# Patient Record
Sex: MALE | Race: WHITE | NOT HISPANIC OR LATINO | Employment: FULL TIME | URBAN - METROPOLITAN AREA
[De-identification: names, ages, dates, MRNs, and addresses within clinical notes are randomized per-mention and may not be internally consistent; named-entity substitution may affect disease eponyms.]

---

## 2017-10-28 ENCOUNTER — RESOLUTE PROFESSIONAL BILLING HOSPITAL PROF FEE (OUTPATIENT)
Dept: HOSPITALIST | Facility: MEDICAL CENTER | Age: 27
End: 2017-10-28

## 2017-10-28 ENCOUNTER — APPOINTMENT (OUTPATIENT)
Dept: RADIOLOGY | Facility: MEDICAL CENTER | Age: 27
DRG: 958 | End: 2017-10-28
Attending: EMERGENCY MEDICINE
Payer: COMMERCIAL

## 2017-10-28 ENCOUNTER — HOSPITAL ENCOUNTER (INPATIENT)
Facility: MEDICAL CENTER | Age: 27
LOS: 2 days | DRG: 958 | End: 2017-10-30
Attending: EMERGENCY MEDICINE | Admitting: SURGERY
Payer: COMMERCIAL

## 2017-10-28 DIAGNOSIS — S37.30XA INJURY OF URETHRA, INITIAL ENCOUNTER: ICD-10-CM
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
## 2017-10-28 DIAGNOSIS — F10.921 ALCOHOL INTOXICATION WITH DELIRIUM (HCC): ICD-10-CM

## 2017-10-28 PROBLEM — S31.000A: Status: ACTIVE | Noted: 2017-10-28

## 2017-10-28 PROBLEM — T14.90XA TRAUMA: Status: ACTIVE | Noted: 2017-10-28

## 2017-10-28 PROBLEM — F10.929 ACUTE ALCOHOL INTOXICATION (HCC): Status: ACTIVE | Noted: 2017-10-28

## 2017-10-28 LAB
ABO GROUP BLD: NORMAL
ALBUMIN SERPL BCP-MCNC: 5.1 G/DL (ref 3.2–4.9)
ALBUMIN/GLOB SERPL: 1.3 G/DL
ALP SERPL-CCNC: 87 U/L (ref 30–99)
ALT SERPL-CCNC: 36 U/L (ref 2–50)
ANION GAP SERPL CALC-SCNC: 22 MMOL/L (ref 0–11.9)
APTT PPP: 24.9 SEC (ref 24.7–36)
AST SERPL-CCNC: 35 U/L (ref 12–45)
BILIRUB SERPL-MCNC: 0.7 MG/DL (ref 0.1–1.5)
BLD GP AB SCN SERPL QL: NORMAL
BUN SERPL-MCNC: 8 MG/DL (ref 8–22)
CALCIUM SERPL-MCNC: 9.2 MG/DL (ref 8.5–10.5)
CHLORIDE SERPL-SCNC: 106 MMOL/L (ref 96–112)
CO2 SERPL-SCNC: 14 MMOL/L (ref 20–33)
CREAT SERPL-MCNC: 0.77 MG/DL (ref 0.5–1.4)
ERYTHROCYTE [DISTWIDTH] IN BLOOD BY AUTOMATED COUNT: 42.3 FL (ref 35.9–50)
ETHANOL BLD-MCNC: 0.21 G/DL
GFR SERPL CREATININE-BSD FRML MDRD: >60 ML/MIN/1.73 M 2
GLOBULIN SER CALC-MCNC: 3.8 G/DL (ref 1.9–3.5)
GLUCOSE SERPL-MCNC: 139 MG/DL (ref 65–99)
HCT VFR BLD AUTO: 50.9 % (ref 42–52)
HGB BLD-MCNC: 17.8 G/DL (ref 14–18)
INR PPP: 1.05 (ref 0.87–1.13)
MCH RBC QN AUTO: 31 PG (ref 27–33)
MCHC RBC AUTO-ENTMCNC: 35 G/DL (ref 33.7–35.3)
MCV RBC AUTO: 88.7 FL (ref 81.4–97.8)
PLATELET # BLD AUTO: 229 K/UL (ref 164–446)
PMV BLD AUTO: 8.8 FL (ref 9–12.9)
POTASSIUM SERPL-SCNC: 2.9 MMOL/L (ref 3.6–5.5)
PROT SERPL-MCNC: 8.9 G/DL (ref 6–8.2)
PROTHROMBIN TIME: 13.4 SEC (ref 12–14.6)
RBC # BLD AUTO: 5.74 M/UL (ref 4.7–6.1)
RH BLD: NORMAL
SODIUM SERPL-SCNC: 142 MMOL/L (ref 135–145)
WBC # BLD AUTO: 8.3 K/UL (ref 4.8–10.8)

## 2017-10-28 PROCEDURE — 700111 HCHG RX REV CODE 636 W/ 250 OVERRIDE (IP): Performed by: SURGERY

## 2017-10-28 PROCEDURE — 99233 SBSQ HOSP IP/OBS HIGH 50: CPT | Performed by: SURGERY

## 2017-10-28 PROCEDURE — 700101 HCHG RX REV CODE 250

## 2017-10-28 PROCEDURE — 500380 HCHG DRAIN, PENROSE 1/4X12: Performed by: UROLOGY

## 2017-10-28 PROCEDURE — 160035 HCHG PACU - 1ST 60 MINS PHASE I: Performed by: UROLOGY

## 2017-10-28 PROCEDURE — 80307 DRUG TEST PRSMV CHEM ANLYZR: CPT

## 2017-10-28 PROCEDURE — 85027 COMPLETE CBC AUTOMATED: CPT

## 2017-10-28 PROCEDURE — 99291 CRITICAL CARE FIRST HOUR: CPT

## 2017-10-28 PROCEDURE — 160002 HCHG RECOVERY MINUTES (STAT): Performed by: UROLOGY

## 2017-10-28 PROCEDURE — 72131 CT LUMBAR SPINE W/O DYE: CPT

## 2017-10-28 PROCEDURE — 700102 HCHG RX REV CODE 250 W/ 637 OVERRIDE(OP)

## 2017-10-28 PROCEDURE — 70486 CT MAXILLOFACIAL W/O DYE: CPT

## 2017-10-28 PROCEDURE — 93010 ELECTROCARDIOGRAM REPORT: CPT | Performed by: INTERNAL MEDICINE

## 2017-10-28 PROCEDURE — 3E0234Z INTRODUCTION OF SERUM, TOXOID AND VACCINE INTO MUSCLE, PERCUTANEOUS APPROACH: ICD-10-PCS | Performed by: EMERGENCY MEDICINE

## 2017-10-28 PROCEDURE — 500257: Performed by: UROLOGY

## 2017-10-28 PROCEDURE — 700117 HCHG RX CONTRAST REV CODE 255: Performed by: EMERGENCY MEDICINE

## 2017-10-28 PROCEDURE — 307744 HCHG RED TRAUMA TEAM SERVICES

## 2017-10-28 PROCEDURE — 500042 HCHG BAG, URINARY DRAINAGE (CLOSED): Performed by: UROLOGY

## 2017-10-28 PROCEDURE — 94760 N-INVAS EAR/PLS OXIMETRY 1: CPT

## 2017-10-28 PROCEDURE — 80053 COMPREHEN METABOLIC PANEL: CPT

## 2017-10-28 PROCEDURE — 71010 DX-CHEST-PORTABLE (1 VIEW): CPT

## 2017-10-28 PROCEDURE — C1769 GUIDE WIRE: HCPCS | Performed by: UROLOGY

## 2017-10-28 PROCEDURE — 88305 TISSUE EXAM BY PATHOLOGIST: CPT

## 2017-10-28 PROCEDURE — 0TJB8ZZ INSPECTION OF BLADDER, VIA NATURAL OR ARTIFICIAL OPENING ENDOSCOPIC: ICD-10-PCS | Performed by: UROLOGY

## 2017-10-28 PROCEDURE — 0VJ80ZZ INSPECTION OF SCROTUM AND TUNICA VAGINALIS, OPEN APPROACH: ICD-10-PCS | Performed by: UROLOGY

## 2017-10-28 PROCEDURE — 71260 CT THORAX DX C+: CPT

## 2017-10-28 PROCEDURE — 160028 HCHG SURGERY MINUTES - 1ST 30 MINS LEVEL 3: Performed by: UROLOGY

## 2017-10-28 PROCEDURE — 700111 HCHG RX REV CODE 636 W/ 250 OVERRIDE (IP)

## 2017-10-28 PROCEDURE — 500879 HCHG PACK, CYSTO: Performed by: UROLOGY

## 2017-10-28 PROCEDURE — 700105 HCHG RX REV CODE 258: Performed by: SURGERY

## 2017-10-28 PROCEDURE — 86850 RBC ANTIBODY SCREEN: CPT

## 2017-10-28 PROCEDURE — 160048 HCHG OR STATISTICAL LEVEL 1-5: Performed by: UROLOGY

## 2017-10-28 PROCEDURE — 0VQ90ZZ REPAIR RIGHT TESTIS, OPEN APPROACH: ICD-10-PCS | Performed by: UROLOGY

## 2017-10-28 PROCEDURE — 90715 TDAP VACCINE 7 YRS/> IM: CPT | Performed by: EMERGENCY MEDICINE

## 2017-10-28 PROCEDURE — 96365 THER/PROPH/DIAG IV INF INIT: CPT

## 2017-10-28 PROCEDURE — 501838 HCHG SUTURE GENERAL: Performed by: UROLOGY

## 2017-10-28 PROCEDURE — 700111 HCHG RX REV CODE 636 W/ 250 OVERRIDE (IP): Performed by: EMERGENCY MEDICINE

## 2017-10-28 PROCEDURE — A9270 NON-COVERED ITEM OR SERVICE: HCPCS | Performed by: SURGERY

## 2017-10-28 PROCEDURE — 0JQC0ZZ REPAIR PELVIC REGION SUBCUTANEOUS TISSUE AND FASCIA, OPEN APPROACH: ICD-10-PCS | Performed by: UROLOGY

## 2017-10-28 PROCEDURE — 72170 X-RAY EXAM OF PELVIS: CPT

## 2017-10-28 PROCEDURE — 93005 ELECTROCARDIOGRAM TRACING: CPT | Performed by: SURGERY

## 2017-10-28 PROCEDURE — 0T9B70Z DRAINAGE OF BLADDER WITH DRAINAGE DEVICE, VIA NATURAL OR ARTIFICIAL OPENING: ICD-10-PCS | Performed by: UROLOGY

## 2017-10-28 PROCEDURE — 72125 CT NECK SPINE W/O DYE: CPT

## 2017-10-28 PROCEDURE — 86901 BLOOD TYPING SEROLOGIC RH(D): CPT

## 2017-10-28 PROCEDURE — A4357 BEDSIDE DRAINAGE BAG: HCPCS | Performed by: UROLOGY

## 2017-10-28 PROCEDURE — 160039 HCHG SURGERY MINUTES - EA ADDL 1 MIN LEVEL 3: Performed by: UROLOGY

## 2017-10-28 PROCEDURE — 700101 HCHG RX REV CODE 250: Performed by: SURGERY

## 2017-10-28 PROCEDURE — 700111 HCHG RX REV CODE 636 W/ 250 OVERRIDE (IP): Performed by: UROLOGY

## 2017-10-28 PROCEDURE — 86900 BLOOD TYPING SEROLOGIC ABO: CPT

## 2017-10-28 PROCEDURE — 500448 HCHG DRESSING, TELFA 3X4: Performed by: UROLOGY

## 2017-10-28 PROCEDURE — 501329 HCHG SET, CYSTO IRRIG Y TUR: Performed by: UROLOGY

## 2017-10-28 PROCEDURE — 700102 HCHG RX REV CODE 250 W/ 637 OVERRIDE(OP): Performed by: SURGERY

## 2017-10-28 PROCEDURE — 70450 CT HEAD/BRAIN W/O DYE: CPT

## 2017-10-28 PROCEDURE — 72128 CT CHEST SPINE W/O DYE: CPT

## 2017-10-28 PROCEDURE — 160009 HCHG ANES TIME/MIN: Performed by: UROLOGY

## 2017-10-28 PROCEDURE — 160036 HCHG PACU - EA ADDL 30 MINS PHASE I: Performed by: UROLOGY

## 2017-10-28 PROCEDURE — 770006 HCHG ROOM/CARE - MED/SURG/GYN SEMI*

## 2017-10-28 PROCEDURE — 85730 THROMBOPLASTIN TIME PARTIAL: CPT

## 2017-10-28 PROCEDURE — A9270 NON-COVERED ITEM OR SERVICE: HCPCS

## 2017-10-28 PROCEDURE — 85610 PROTHROMBIN TIME: CPT

## 2017-10-28 PROCEDURE — 500266 HCHG CATH, SUPRAPUBIC INTRO SET: Performed by: UROLOGY

## 2017-10-28 PROCEDURE — 72192 CT PELVIS W/O DYE: CPT

## 2017-10-28 PROCEDURE — 700112 HCHG RX REV CODE 229: Performed by: SURGERY

## 2017-10-28 PROCEDURE — 501445 HCHG STAPLER, SKIN DISP: Performed by: UROLOGY

## 2017-10-28 PROCEDURE — 90471 IMMUNIZATION ADMIN: CPT

## 2017-10-28 RX ORDER — ACETAMINOPHEN 500 MG
1000 TABLET ORAL EVERY 6 HOURS
Status: DISCONTINUED | OUTPATIENT
Start: 2017-10-28 | End: 2017-10-30 | Stop reason: HOSPADM

## 2017-10-28 RX ORDER — OXYCODONE HYDROCHLORIDE 10 MG/1
10 TABLET ORAL
Status: DISCONTINUED | OUTPATIENT
Start: 2017-10-28 | End: 2017-10-30 | Stop reason: HOSPADM

## 2017-10-28 RX ORDER — FOLIC ACID 1 MG/1
1 TABLET ORAL DAILY
Status: DISCONTINUED | OUTPATIENT
Start: 2017-10-29 | End: 2017-10-30 | Stop reason: HOSPADM

## 2017-10-28 RX ORDER — MORPHINE SULFATE 4 MG/ML
4 INJECTION, SOLUTION INTRAMUSCULAR; INTRAVENOUS
Status: DISCONTINUED | OUTPATIENT
Start: 2017-10-28 | End: 2017-10-28

## 2017-10-28 RX ORDER — CEFAZOLIN SODIUM 2 G/100ML
2 INJECTION, SOLUTION INTRAVENOUS EVERY 8 HOURS
Status: COMPLETED | OUTPATIENT
Start: 2017-10-28 | End: 2017-10-28

## 2017-10-28 RX ORDER — AMOXICILLIN 250 MG
1 CAPSULE ORAL
Status: DISCONTINUED | OUTPATIENT
Start: 2017-10-28 | End: 2017-10-30 | Stop reason: HOSPADM

## 2017-10-28 RX ORDER — FAMOTIDINE 20 MG/1
20 TABLET, FILM COATED ORAL 2 TIMES DAILY
Status: DISCONTINUED | OUTPATIENT
Start: 2017-10-28 | End: 2017-10-28

## 2017-10-28 RX ORDER — ENEMA 19; 7 G/133ML; G/133ML
1 ENEMA RECTAL
Status: DISCONTINUED | OUTPATIENT
Start: 2017-10-28 | End: 2017-10-30 | Stop reason: HOSPADM

## 2017-10-28 RX ORDER — THIAMINE MONONITRATE (VIT B1) 100 MG
100 TABLET ORAL DAILY
Status: DISCONTINUED | OUTPATIENT
Start: 2017-10-29 | End: 2017-10-30 | Stop reason: HOSPADM

## 2017-10-28 RX ORDER — ONDANSETRON 2 MG/ML
4 INJECTION INTRAMUSCULAR; INTRAVENOUS EVERY 4 HOURS PRN
Status: DISCONTINUED | OUTPATIENT
Start: 2017-10-28 | End: 2017-10-30 | Stop reason: HOSPADM

## 2017-10-28 RX ORDER — CHLORHEXIDINE GLUCONATE ORAL RINSE 1.2 MG/ML
15 SOLUTION DENTAL EVERY 12 HOURS
Status: DISCONTINUED | OUTPATIENT
Start: 2017-10-28 | End: 2017-10-28

## 2017-10-28 RX ORDER — BISACODYL 10 MG
10 SUPPOSITORY, RECTAL RECTAL
Status: DISCONTINUED | OUTPATIENT
Start: 2017-10-28 | End: 2017-10-30 | Stop reason: HOSPADM

## 2017-10-28 RX ORDER — OXYCODONE HYDROCHLORIDE 5 MG/1
5 TABLET ORAL
Status: DISCONTINUED | OUTPATIENT
Start: 2017-10-28 | End: 2017-10-30 | Stop reason: HOSPADM

## 2017-10-28 RX ORDER — SODIUM CHLORIDE, SODIUM LACTATE, POTASSIUM CHLORIDE, CALCIUM CHLORIDE 600; 310; 30; 20 MG/100ML; MG/100ML; MG/100ML; MG/100ML
INJECTION, SOLUTION INTRAVENOUS CONTINUOUS
Status: DISCONTINUED | OUTPATIENT
Start: 2017-10-28 | End: 2017-10-30

## 2017-10-28 RX ORDER — MIDAZOLAM HYDROCHLORIDE 1 MG/ML
INJECTION INTRAMUSCULAR; INTRAVENOUS
Status: DISCONTINUED
Start: 2017-10-28 | End: 2017-10-28

## 2017-10-28 RX ORDER — POLYETHYLENE GLYCOL 3350 17 G/17G
1 POWDER, FOR SOLUTION ORAL 2 TIMES DAILY
Status: DISCONTINUED | OUTPATIENT
Start: 2017-10-28 | End: 2017-10-30 | Stop reason: HOSPADM

## 2017-10-28 RX ORDER — AMOXICILLIN 250 MG
1 CAPSULE ORAL NIGHTLY
Status: DISCONTINUED | OUTPATIENT
Start: 2017-10-28 | End: 2017-10-30 | Stop reason: HOSPADM

## 2017-10-28 RX ORDER — MORPHINE SULFATE 4 MG/ML
2 INJECTION, SOLUTION INTRAMUSCULAR; INTRAVENOUS
Status: DISCONTINUED | OUTPATIENT
Start: 2017-10-28 | End: 2017-10-30 | Stop reason: HOSPADM

## 2017-10-28 RX ORDER — DOCUSATE SODIUM 100 MG/1
100 CAPSULE, LIQUID FILLED ORAL 2 TIMES DAILY
Status: DISCONTINUED | OUTPATIENT
Start: 2017-10-28 | End: 2017-10-30 | Stop reason: HOSPADM

## 2017-10-28 RX ORDER — BACITRACIN ZINC AND POLYMYXIN B SULFATE 500; 1000 [USP'U]/G; [USP'U]/G
OINTMENT TOPICAL 3 TIMES DAILY
Status: DISCONTINUED | OUTPATIENT
Start: 2017-10-28 | End: 2017-10-30 | Stop reason: HOSPADM

## 2017-10-28 RX ORDER — MEPERIDINE HYDROCHLORIDE 25 MG/ML
INJECTION INTRAMUSCULAR; INTRAVENOUS; SUBCUTANEOUS
Status: COMPLETED
Start: 2017-10-28 | End: 2017-10-28

## 2017-10-28 RX ORDER — IBUPROFEN 800 MG/1
800 TABLET ORAL
Status: DISCONTINUED | OUTPATIENT
Start: 2017-10-28 | End: 2017-10-30 | Stop reason: HOSPADM

## 2017-10-28 RX ADMIN — CEFAZOLIN SODIUM 2 G: 2 INJECTION, SOLUTION INTRAVENOUS at 21:23

## 2017-10-28 RX ADMIN — IBUPROFEN 800 MG: 800 TABLET, FILM COATED ORAL at 17:21

## 2017-10-28 RX ADMIN — Medication 1 EACH: at 10:28

## 2017-10-28 RX ADMIN — DOCUSATE SODIUM 100 MG: 100 CAPSULE ORAL at 21:23

## 2017-10-28 RX ADMIN — CEFAZOLIN SODIUM 1 G: 1 INJECTION, SOLUTION INTRAVENOUS at 08:15

## 2017-10-28 RX ADMIN — IOHEXOL 50 ML: 240 INJECTION, SOLUTION INTRATHECAL; INTRAVASCULAR; INTRAVENOUS; ORAL at 08:38

## 2017-10-28 RX ADMIN — CEFAZOLIN SODIUM 2 G: 2 INJECTION, SOLUTION INTRAVENOUS at 15:12

## 2017-10-28 RX ADMIN — OXYCODONE HYDROCHLORIDE 10 MG: 10 TABLET ORAL at 21:23

## 2017-10-28 RX ADMIN — OXYCODONE HYDROCHLORIDE 10 MG: 10 TABLET ORAL at 16:02

## 2017-10-28 RX ADMIN — CLOSTRIDIUM TETANI TOXOID ANTIGEN (FORMALDEHYDE INACTIVATED), CORYNEBACTERIUM DIPHTHERIAE TOXOID ANTIGEN (FORMALDEHYDE INACTIVATED), BORDETELLA PERTUSSIS TOXOID ANTIGEN (GLUTARALDEHYDE INACTIVATED), BORDETELLA PERTUSSIS FILAMENTOUS HEMAGGLUTININ ANTIGEN (FORMALDEHYDE INACTIVATED), BORDETELLA PERTUSSIS PERTACTIN ANTIGEN, AND BORDETELLA PERTUSSIS FIMBRIAE 2/3 ANTIGEN 0.5 ML: 5; 2; 2.5; 5; 3; 5 INJECTION, SUSPENSION INTRAMUSCULAR at 07:54

## 2017-10-28 RX ADMIN — FOLIC ACID: 5 INJECTION, SOLUTION INTRAMUSCULAR; INTRAVENOUS; SUBCUTANEOUS at 10:15

## 2017-10-28 RX ADMIN — SODIUM CHLORIDE, POTASSIUM CHLORIDE, SODIUM LACTATE AND CALCIUM CHLORIDE: 600; 310; 30; 20 INJECTION, SOLUTION INTRAVENOUS at 10:41

## 2017-10-28 RX ADMIN — ACETAMINOPHEN 1000 MG: 500 TABLET ORAL at 17:21

## 2017-10-28 RX ADMIN — FAMOTIDINE 20 MG: 10 INJECTION, SOLUTION INTRAVENOUS at 10:33

## 2017-10-28 RX ADMIN — Medication 1 EACH: at 21:24

## 2017-10-28 RX ADMIN — IOHEXOL 100 ML: 350 INJECTION, SOLUTION INTRAVENOUS at 08:38

## 2017-10-28 RX ADMIN — Medication 1 EACH: at 15:33

## 2017-10-28 RX ADMIN — STANDARDIZED SENNA CONCENTRATE AND DOCUSATE SODIUM 1 TABLET: 8.6; 5 TABLET, FILM COATED ORAL at 21:24

## 2017-10-28 RX ADMIN — MEPERIDINE HYDROCHLORIDE 25 MG: 25 INJECTION INTRAMUSCULAR; INTRAVENOUS; SUBCUTANEOUS at 13:10

## 2017-10-28 RX ADMIN — MORPHINE SULFATE 4 MG: 4 INJECTION INTRAVENOUS at 10:27

## 2017-10-28 ASSESSMENT — PATIENT HEALTH QUESTIONNAIRE - PHQ9
2. FEELING DOWN, DEPRESSED, IRRITABLE, OR HOPELESS: NOT AT ALL
SUM OF ALL RESPONSES TO PHQ9 QUESTIONS 1 AND 2: 0
SUM OF ALL RESPONSES TO PHQ QUESTIONS 1-9: 0
SUM OF ALL RESPONSES TO PHQ9 QUESTIONS 1 AND 2: 0
SUM OF ALL RESPONSES TO PHQ QUESTIONS 1-9: 0
1. LITTLE INTEREST OR PLEASURE IN DOING THINGS: NOT AT ALL
1. LITTLE INTEREST OR PLEASURE IN DOING THINGS: NOT AT ALL
2. FEELING DOWN, DEPRESSED, IRRITABLE, OR HOPELESS: NOT AT ALL

## 2017-10-28 ASSESSMENT — LIFESTYLE VARIABLES
EVER HAD A DRINK FIRST THING IN THE MORNING TO STEADY YOUR NERVES TO GET RID OF A HANGOVER: NO
EVER FELT BAD OR GUILTY ABOUT YOUR DRINKING: YES
HAVE YOU EVER FELT YOU SHOULD CUT DOWN ON YOUR DRINKING: YES
TOTAL SCORE: 2
EVER_SMOKED: NEVER
DO YOU DRINK ALCOHOL: YES
EVER_SMOKED: YES
TOTAL SCORE: 2
ON A TYPICAL DAY WHEN YOU DRINK ALCOHOL HOW MANY DRINKS DO YOU HAVE: 1
CONSUMPTION TOTAL: POSITIVE
HAVE PEOPLE ANNOYED YOU BY CRITICIZING YOUR DRINKING: NO
AVERAGE NUMBER OF DAYS PER WEEK YOU HAVE A DRINK CONTAINING ALCOHOL: 2
TOTAL SCORE: 2
HOW MANY TIMES IN THE PAST YEAR HAVE YOU HAD 5 OR MORE DRINKS IN A DAY: 10
DOES PATIENT WANT TO TALK TO SOMEONE ABOUT QUITTING: YES
DOES PATIENT WANT TO STOP DRINKING: YES

## 2017-10-28 ASSESSMENT — ENCOUNTER SYMPTOMS
MYALGIAS: 1
NEUROLOGICAL NEGATIVE: 1
RESPIRATORY NEGATIVE: 1
GASTROINTESTINAL NEGATIVE: 1
PSYCHIATRIC NEGATIVE: 1
CONSTITUTIONAL NEGATIVE: 1

## 2017-10-28 ASSESSMENT — COPD QUESTIONNAIRES
HAVE YOU SMOKED AT LEAST 100 CIGARETTES IN YOUR ENTIRE LIFE: NO/DON'T KNOW
DURING THE PAST 4 WEEKS HOW MUCH DID YOU FEEL SHORT OF BREATH: NONE/LITTLE OF THE TIME
COPD SCREENING SCORE: 0
DO YOU EVER COUGH UP ANY MUCUS OR PHLEGM?: NO/ONLY WITH OCCASIONAL COLDS OR INFECTIONS

## 2017-10-28 ASSESSMENT — PAIN SCALES - GENERAL
PAINLEVEL_OUTOF10: 8
PAINLEVEL_OUTOF10: 9

## 2017-10-28 NOTE — PROGRESS NOTES
Patient to pre-op with anesthesia. Family at bedside and [place] . Report given to anesthesia and OR RN.
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
## 2017-10-28 NOTE — OR NURSING
Per  Mock patient combative while waking up, re medicated with versed. RN instructed to let patient rest and not to cycle blood pressures per PACU protocol. RN to obtain blood pressure when patient calm and appropriate.

## 2017-10-28 NOTE — PROGRESS NOTES
Patient to unit from CT scan, awaiting transfer to pre op with Dr Castro. [place] [employer] at bedside. All belongings taken by PD as evidence. VSS, patient on RA. No gtts.
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
## 2017-10-28 NOTE — OR NURSING
Report called to Rachel DÍAZ. Plan of care discussed. Patient resting with eyes closed, even and unlabored respirations noted. No acute s/s pf distress noted. Patient arousable to voice. Denies pain at this time.
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
## 2017-10-28 NOTE — RESPIRATORY CARE
Respiratory Trauma Red Note    Intubation no    Patient on RA. No respiratory assistance needed at this time. See trauma narrator for full details.
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
## 2017-10-29 LAB
ABO GROUP BLD: NORMAL
ALBUMIN SERPL BCP-MCNC: 3.6 G/DL (ref 3.2–4.9)
ALBUMIN/GLOB SERPL: 1.3 G/DL
ALP SERPL-CCNC: 64 U/L (ref 30–99)
ALT SERPL-CCNC: 23 U/L (ref 2–50)
ANION GAP SERPL CALC-SCNC: 6 MMOL/L (ref 0–11.9)
AST SERPL-CCNC: 27 U/L (ref 12–45)
BASOPHILS # BLD AUTO: 0.2 % (ref 0–1.8)
BASOPHILS # BLD AUTO: 0.3 % (ref 0–1.8)
BASOPHILS # BLD: 0.02 K/UL (ref 0–0.12)
BASOPHILS # BLD: 0.03 K/UL (ref 0–0.12)
BILIRUB SERPL-MCNC: 2.7 MG/DL (ref 0.1–1.5)
BUN SERPL-MCNC: 13 MG/DL (ref 8–22)
CALCIUM SERPL-MCNC: 8.5 MG/DL (ref 8.5–10.5)
CHLORIDE SERPL-SCNC: 103 MMOL/L (ref 96–112)
CO2 SERPL-SCNC: 25 MMOL/L (ref 20–33)
CREAT SERPL-MCNC: 0.7 MG/DL (ref 0.5–1.4)
EKG IMPRESSION: NORMAL
EOSINOPHIL # BLD AUTO: 0.03 K/UL (ref 0–0.51)
EOSINOPHIL # BLD AUTO: 0.06 K/UL (ref 0–0.51)
EOSINOPHIL NFR BLD: 0.3 % (ref 0–6.9)
EOSINOPHIL NFR BLD: 0.6 % (ref 0–6.9)
ERYTHROCYTE [DISTWIDTH] IN BLOOD BY AUTOMATED COUNT: 43.4 FL (ref 35.9–50)
ERYTHROCYTE [DISTWIDTH] IN BLOOD BY AUTOMATED COUNT: 44.5 FL (ref 35.9–50)
GFR SERPL CREATININE-BSD FRML MDRD: >60 ML/MIN/1.73 M 2
GLOBULIN SER CALC-MCNC: 2.8 G/DL (ref 1.9–3.5)
GLUCOSE SERPL-MCNC: 110 MG/DL (ref 65–99)
HCT VFR BLD AUTO: 38.3 % (ref 42–52)
HCT VFR BLD AUTO: 39 % (ref 42–52)
HGB BLD-MCNC: 13.2 G/DL (ref 14–18)
HGB BLD-MCNC: 13.6 G/DL (ref 14–18)
IMM GRANULOCYTES # BLD AUTO: 0.04 K/UL (ref 0–0.11)
IMM GRANULOCYTES # BLD AUTO: 0.04 K/UL (ref 0–0.11)
IMM GRANULOCYTES NFR BLD AUTO: 0.4 % (ref 0–0.9)
IMM GRANULOCYTES NFR BLD AUTO: 0.4 % (ref 0–0.9)
LYMPHOCYTES # BLD AUTO: 1.71 K/UL (ref 1–4.8)
LYMPHOCYTES # BLD AUTO: 2.1 K/UL (ref 1–4.8)
LYMPHOCYTES NFR BLD: 17.2 % (ref 22–41)
LYMPHOCYTES NFR BLD: 18.5 % (ref 22–41)
MCH RBC QN AUTO: 31.6 PG (ref 27–33)
MCH RBC QN AUTO: 31.8 PG (ref 27–33)
MCHC RBC AUTO-ENTMCNC: 34.5 G/DL (ref 33.7–35.3)
MCHC RBC AUTO-ENTMCNC: 34.9 G/DL (ref 33.7–35.3)
MCV RBC AUTO: 90.5 FL (ref 81.4–97.8)
MCV RBC AUTO: 92.3 FL (ref 81.4–97.8)
MONOCYTES # BLD AUTO: 0.94 K/UL (ref 0–0.85)
MONOCYTES # BLD AUTO: 1.06 K/UL (ref 0–0.85)
MONOCYTES NFR BLD AUTO: 9.3 % (ref 0–13.4)
MONOCYTES NFR BLD AUTO: 9.4 % (ref 0–13.4)
NEUTROPHILS # BLD AUTO: 7.17 K/UL (ref 1.82–7.42)
NEUTROPHILS # BLD AUTO: 8.09 K/UL (ref 1.82–7.42)
NEUTROPHILS NFR BLD: 71.3 % (ref 44–72)
NEUTROPHILS NFR BLD: 72.1 % (ref 44–72)
NRBC # BLD AUTO: 0 K/UL
NRBC # BLD AUTO: 0 K/UL
NRBC BLD AUTO-RTO: 0 /100 WBC
NRBC BLD AUTO-RTO: 0 /100 WBC
PLATELET # BLD AUTO: 150 K/UL (ref 164–446)
PLATELET # BLD AUTO: 163 K/UL (ref 164–446)
PMV BLD AUTO: 9.2 FL (ref 9–12.9)
PMV BLD AUTO: 9.3 FL (ref 9–12.9)
POTASSIUM SERPL-SCNC: 3.5 MMOL/L (ref 3.6–5.5)
PROT SERPL-MCNC: 6.4 G/DL (ref 6–8.2)
RBC # BLD AUTO: 4.15 M/UL (ref 4.7–6.1)
RBC # BLD AUTO: 4.31 M/UL (ref 4.7–6.1)
SODIUM SERPL-SCNC: 134 MMOL/L (ref 135–145)
WBC # BLD AUTO: 10 K/UL (ref 4.8–10.8)
WBC # BLD AUTO: 11.3 K/UL (ref 4.8–10.8)

## 2017-10-29 PROCEDURE — A9270 NON-COVERED ITEM OR SERVICE: HCPCS | Performed by: SURGERY

## 2017-10-29 PROCEDURE — 700101 HCHG RX REV CODE 250: Performed by: SURGERY

## 2017-10-29 PROCEDURE — 85025 COMPLETE CBC W/AUTO DIFF WBC: CPT

## 2017-10-29 PROCEDURE — 770006 HCHG ROOM/CARE - MED/SURG/GYN SEMI*

## 2017-10-29 PROCEDURE — 700102 HCHG RX REV CODE 250 W/ 637 OVERRIDE(OP): Performed by: NURSE PRACTITIONER

## 2017-10-29 PROCEDURE — 700112 HCHG RX REV CODE 229: Performed by: SURGERY

## 2017-10-29 PROCEDURE — 700105 HCHG RX REV CODE 258: Performed by: SURGERY

## 2017-10-29 PROCEDURE — A9270 NON-COVERED ITEM OR SERVICE: HCPCS | Performed by: NURSE PRACTITIONER

## 2017-10-29 PROCEDURE — 36415 COLL VENOUS BLD VENIPUNCTURE: CPT

## 2017-10-29 PROCEDURE — 700102 HCHG RX REV CODE 250 W/ 637 OVERRIDE(OP): Performed by: SURGERY

## 2017-10-29 PROCEDURE — 80053 COMPREHEN METABOLIC PANEL: CPT

## 2017-10-29 RX ADMIN — IBUPROFEN 800 MG: 800 TABLET, FILM COATED ORAL at 09:40

## 2017-10-29 RX ADMIN — POLYETHYLENE GLYCOL 3350 1 PACKET: 17 POWDER, FOR SOLUTION ORAL at 09:40

## 2017-10-29 RX ADMIN — IBUPROFEN 800 MG: 800 TABLET, FILM COATED ORAL at 17:02

## 2017-10-29 RX ADMIN — OXYCODONE HYDROCHLORIDE 10 MG: 10 TABLET ORAL at 01:20

## 2017-10-29 RX ADMIN — ACETAMINOPHEN 1000 MG: 500 TABLET ORAL at 01:20

## 2017-10-29 RX ADMIN — Medication 1 EACH: at 14:26

## 2017-10-29 RX ADMIN — ACETAMINOPHEN 1000 MG: 500 TABLET ORAL at 23:30

## 2017-10-29 RX ADMIN — DOCUSATE SODIUM 100 MG: 100 CAPSULE ORAL at 09:40

## 2017-10-29 RX ADMIN — FOLIC ACID 1 MG: 1 TABLET ORAL at 10:00

## 2017-10-29 RX ADMIN — ACETAMINOPHEN 1000 MG: 500 TABLET ORAL at 17:02

## 2017-10-29 RX ADMIN — DOCUSATE SODIUM 100 MG: 100 CAPSULE ORAL at 20:50

## 2017-10-29 RX ADMIN — POTASSIUM BICARBONATE 25 MEQ: 25 TABLET, EFFERVESCENT ORAL at 14:26

## 2017-10-29 RX ADMIN — Medication 100 MG: at 09:41

## 2017-10-29 RX ADMIN — OXYCODONE HYDROCHLORIDE 10 MG: 10 TABLET ORAL at 09:41

## 2017-10-29 RX ADMIN — POTASSIUM BICARBONATE 25 MEQ: 25 TABLET, EFFERVESCENT ORAL at 20:50

## 2017-10-29 RX ADMIN — OXYCODONE HYDROCHLORIDE 10 MG: 10 TABLET ORAL at 06:12

## 2017-10-29 RX ADMIN — THERA TABS 1 TABLET: TAB at 09:41

## 2017-10-29 RX ADMIN — POLYETHYLENE GLYCOL 3350 1 PACKET: 17 POWDER, FOR SOLUTION ORAL at 20:50

## 2017-10-29 RX ADMIN — STANDARDIZED SENNA CONCENTRATE AND DOCUSATE SODIUM 1 TABLET: 8.6; 5 TABLET, FILM COATED ORAL at 20:50

## 2017-10-29 RX ADMIN — ACETAMINOPHEN 1000 MG: 500 TABLET ORAL at 12:31

## 2017-10-29 RX ADMIN — OXYCODONE HYDROCHLORIDE 10 MG: 10 TABLET ORAL at 17:02

## 2017-10-29 RX ADMIN — IBUPROFEN 800 MG: 800 TABLET, FILM COATED ORAL at 12:31

## 2017-10-29 RX ADMIN — ACETAMINOPHEN 1000 MG: 500 TABLET ORAL at 06:12

## 2017-10-29 RX ADMIN — OXYCODONE HYDROCHLORIDE 10 MG: 10 TABLET ORAL at 20:50

## 2017-10-29 RX ADMIN — OXYCODONE HYDROCHLORIDE 10 MG: 10 TABLET ORAL at 12:30

## 2017-10-29 RX ADMIN — Medication: at 09:00

## 2017-10-29 RX ADMIN — MAGNESIUM HYDROXIDE 30 ML: 400 SUSPENSION ORAL at 09:40

## 2017-10-29 RX ADMIN — SODIUM CHLORIDE, POTASSIUM CHLORIDE, SODIUM LACTATE AND CALCIUM CHLORIDE: 600; 310; 30; 20 INJECTION, SOLUTION INTRAVENOUS at 14:30

## 2017-10-29 RX ADMIN — Medication 1 EACH: at 20:49

## 2017-10-29 RX ADMIN — POTASSIUM BICARBONATE 25 MEQ: 25 TABLET, EFFERVESCENT ORAL at 09:40

## 2017-10-29 ASSESSMENT — ENCOUNTER SYMPTOMS
FEVER: 0
CONSTITUTIONAL NEGATIVE: 1
RESPIRATORY NEGATIVE: 1
ABDOMINAL PAIN: 1
GASTROINTESTINAL NEGATIVE: 1
NEUROLOGICAL NEGATIVE: 1
PSYCHIATRIC NEGATIVE: 1
MYALGIAS: 1
CHILLS: 0
NAUSEA: 0
VOMITING: 0
SHORTNESS OF BREATH: 0
SENSORY CHANGE: 0

## 2017-10-29 ASSESSMENT — PAIN SCALES - GENERAL
PAINLEVEL_OUTOF10: 9
PAINLEVEL_OUTOF10: 7
PAINLEVEL_OUTOF10: 7
PAINLEVEL_OUTOF10: 8
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
## 2017-10-29 NOTE — CARE PLAN
Problem: Pain Management  Goal: Pain level will decrease to patient's comfort goal  Outcome: PROGRESSING AS EXPECTED  Patient receiving oral analgesics with adequate pain relief    Problem: Skin Integrity  Goal: Risk for impaired skin integrity will decrease  Outcome: PROGRESSING AS EXPECTED  Penrose drain pulled baci applied, guaze placed ashley ice pack placed.

## 2017-10-29 NOTE — PROGRESS NOTES
Hemoglobin level drop noted to 13 from 17. Pt asymptomatic. VSS. Surgical site drainage amount decreased from last night. Will continue to monitor pt closely.
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
## 2017-10-29 NOTE — PROGRESS NOTES
Received report from day shift RN.  Pt A&O x 4.   Pain reported at 9/10. Medicated per MAR.   Pt denies nausea, vomiting, chest pain, shortness of breath.   Rally bag running via PIV.   Gauze at scrotum surgical site changed. Moderate amount of blood noted at surgical site with penrose drain.  SCDs in place.   Pt ambulates with 2 assists due to pain.   Last BM was on 10/27 PTA. +BS x 4. Suprapubic catheter in place.  Call light within reach. Bed locked and in lowest position.  All needs are met at this time. Plan of care discussed with pt.    Pt educated regarding fall risk and intervention. Pt verbalized understanding and still refusing bed alarm. Pt A&O x4. Pt demonstrates appropriate use of call light to call for assistance. Hourly rounding in place.

## 2017-10-29 NOTE — CARE PLAN
Problem: Safety  Goal: Will remain free from falls  Outcome: PROGRESSING AS EXPECTED  Pt calls for assistance appropriately. Hourly rounding in place. Pt's room near nursing station.     Problem: Venous Thromboembolism (VTW)/Deep Vein Thrombosis (DVT) Prevention:  Goal: Patient will participate in Venous Thrombosis (VTE)/Deep Vein Thrombosis (DVT)Prevention Measures  Outcome: PROGRESSING AS EXPECTED  SCDs in place.    Problem: Skin Integrity  Goal: Risk for impaired skin integrity will decrease  Outcome: PROGRESSING AS EXPECTED  Dressing changed as needed.

## 2017-10-29 NOTE — PROGRESS NOTES
"/51   Pulse (!) 55   Temp 36.6 °C (97.8 °F)   Resp 17   Ht 1.727 m (5' 8\")   Wt 88.3 kg (194 lb 10.7 oz)   SpO2 93%   BMI 29.60 kg/m²     Hemoglobin stable  Ice to scrotum  Mobilize    "
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
## 2017-10-29 NOTE — PROGRESS NOTES
Sarahi SANCHES called regarding pt's heavy drainage from surgical site. She was also updated on hemoglobin level drop.

## 2017-10-30 VITALS
WEIGHT: 194.67 LBS | BODY MASS INDEX: 29.5 KG/M2 | HEIGHT: 68 IN | SYSTOLIC BLOOD PRESSURE: 107 MMHG | RESPIRATION RATE: 17 BRPM | TEMPERATURE: 98.4 F | DIASTOLIC BLOOD PRESSURE: 58 MMHG | OXYGEN SATURATION: 95 % | HEART RATE: 62 BPM

## 2017-10-30 LAB
MAGNESIUM SERPL-MCNC: 2.4 MG/DL (ref 1.5–2.5)
PHOSPHATE SERPL-MCNC: 2.4 MG/DL (ref 2.5–4.5)

## 2017-10-30 PROCEDURE — A9270 NON-COVERED ITEM OR SERVICE: HCPCS | Performed by: SURGERY

## 2017-10-30 PROCEDURE — 700112 HCHG RX REV CODE 229: Performed by: SURGERY

## 2017-10-30 PROCEDURE — 83735 ASSAY OF MAGNESIUM: CPT

## 2017-10-30 PROCEDURE — 36415 COLL VENOUS BLD VENIPUNCTURE: CPT

## 2017-10-30 PROCEDURE — 700101 HCHG RX REV CODE 250

## 2017-10-30 PROCEDURE — 700105 HCHG RX REV CODE 258

## 2017-10-30 PROCEDURE — 700102 HCHG RX REV CODE 250 W/ 637 OVERRIDE(OP): Performed by: SURGERY

## 2017-10-30 PROCEDURE — 700101 HCHG RX REV CODE 250: Performed by: SURGERY

## 2017-10-30 PROCEDURE — 84100 ASSAY OF PHOSPHORUS: CPT

## 2017-10-30 RX ORDER — OXYCODONE HYDROCHLORIDE 10 MG/1
5-10 TABLET ORAL EVERY 4 HOURS PRN
Qty: 30 TAB | Refills: 0 | Status: SHIPPED | OUTPATIENT
Start: 2017-10-30 | End: 2017-11-24

## 2017-10-30 RX ORDER — ACETAMINOPHEN 500 MG
TABLET ORAL
Qty: 60 TAB | Refills: 0 | Status: SHIPPED | OUTPATIENT
Start: 2017-10-30 | End: 2017-11-24

## 2017-10-30 RX ORDER — IBUPROFEN 800 MG/1
TABLET ORAL
Qty: 60 TAB | Refills: 0 | Status: SHIPPED | OUTPATIENT
Start: 2017-10-30 | End: 2017-11-24

## 2017-10-30 RX ORDER — SODIUM CHLORIDE 9 MG/ML
INJECTION, SOLUTION INTRAVENOUS
Status: COMPLETED
Start: 2017-10-30 | End: 2017-10-30

## 2017-10-30 RX ADMIN — IBUPROFEN 800 MG: 800 TABLET, FILM COATED ORAL at 12:22

## 2017-10-30 RX ADMIN — Medication 1 EACH: at 15:05

## 2017-10-30 RX ADMIN — OXYCODONE HYDROCHLORIDE 10 MG: 10 TABLET ORAL at 05:20

## 2017-10-30 RX ADMIN — Medication 100 MG: at 09:00

## 2017-10-30 RX ADMIN — OXYCODONE HYDROCHLORIDE 10 MG: 10 TABLET ORAL at 09:00

## 2017-10-30 RX ADMIN — Medication 1 EACH: at 09:00

## 2017-10-30 RX ADMIN — ACETAMINOPHEN 1000 MG: 500 TABLET ORAL at 05:20

## 2017-10-30 RX ADMIN — THERA TABS 1 TABLET: TAB at 09:00

## 2017-10-30 RX ADMIN — FOLIC ACID 1 MG: 1 TABLET ORAL at 09:00

## 2017-10-30 RX ADMIN — IBUPROFEN 800 MG: 800 TABLET, FILM COATED ORAL at 09:00

## 2017-10-30 RX ADMIN — POLYETHYLENE GLYCOL 3350 1 PACKET: 17 POWDER, FOR SOLUTION ORAL at 09:01

## 2017-10-30 RX ADMIN — DOCUSATE SODIUM 100 MG: 100 CAPSULE ORAL at 09:00

## 2017-10-30 RX ADMIN — ACETAMINOPHEN 1000 MG: 500 TABLET ORAL at 12:22

## 2017-10-30 RX ADMIN — SODIUM CHLORIDE 1000 ML: 9 INJECTION, SOLUTION INTRAVENOUS at 13:09

## 2017-10-30 ASSESSMENT — ENCOUNTER SYMPTOMS
NEUROLOGICAL NEGATIVE: 1
PSYCHIATRIC NEGATIVE: 1
RESPIRATORY NEGATIVE: 1
CONSTITUTIONAL NEGATIVE: 1
MYALGIAS: 1
GASTROINTESTINAL NEGATIVE: 1

## 2017-10-30 ASSESSMENT — PAIN SCALES - GENERAL
PAINLEVEL_OUTOF10: 4
PAINLEVEL_OUTOF10: 7

## 2017-10-30 NOTE — CARE PLAN
Problem: Safety  Goal: Will remain free from falls  Outcome: PROGRESSING AS EXPECTED  Pt calls for assistance appropriately. Pt ambulates with standby assist.     Problem: Bowel/Gastric:  Goal: Normal bowel function is maintained or improved  Outcome: PROGRESSING AS EXPECTED  Pt had BM tonight, medium stool.

## 2017-10-30 NOTE — PROGRESS NOTES
Received report from day shift RN.   A&O x 4. Pashto speaker.   Pain reported at 7/10. Medicated per MAR.  Pt denies nausea, vomiting, chest pain, and shortness of breath at this time.  Pt has low appetite.   Gauze at scrotum surgical site changed. Small amount of serosanguinous drainage noted at old dressing.   SCDs in place.   Last BM was on 10/27 PTA. +BS x 4. +flatus. Scheduled stools softeners given per MAR.  Suprapubic catheter in place with orange color clear urine.   Call light within reach. Bed locked and in lowest position.   All needs are met at this time. Plan of care discussed with pt.
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
## 2017-10-30 NOTE — DISCHARGE INSTRUCTIONS
- Call or seek medical attention for questions or concerns   - Follow up with Dr. Castro in 1-2 weeks time   - Follow up with primary care provider within one weeks time   - Resume regular diet   - Continue daily over the counter stool softener while on narcotics   - No operation of machinery or motorized vehicles while under the influence of narcotics   - No alcohol use while under the influence of narcotics   - No swimming, hot tubs, baths or wound submersion until cleared by outpatient provider. May shower   - No contact sports, strenuous activities, or heavy lifting until cleared by outpatient provider   - If respiratory decompensation, change in condition or worsening condition, or signs or symptoms of infection occur seek medical attention    Suprapubic Catheter Home Guide  A suprapubic catheter is a rubber tube with a tiny balloon on the end. It is used to drain urine from the bladder. This catheter is put in your bladder through a small opening in the lower center part of your abdomen. Suprapubic refers to the area right above your pubic bone.  The balloon on the end of the catheter is filled with germ-free (sterile) water. This keeps the catheter from slipping out. When the catheter is in place, your urine will drain into a collection bag. The bag can be put beside your bed at night or attached to your leg during the day.  HOW TO CARE FOR YOUR CATHETER   Cleaning your skin  · Clean the skin around the catheter opening every day.  ¨ Wash your hands with soap and water.  ¨ Clean the skin around the opening with a clean washcloth and soapy water. Do not pull on the tube.  ¨ Pat the area dry with a clean towel.  · Your caregiver may want you to put a bandage (dressing) over the site. Do not use ointment on this area unless your caregiver tells you to.  Cleaning the catheter  · Ask your caregiver if you need to clean the catheter and how often.  · Use only soap and water.  · There may be crusts on the catheter.  Put hydrogen peroxide on a cotton ball or gauze pad to remove any crust.   Emptying the collection bag  · You may have a large drainage bag to use at night and a smaller one for daytime. Empty the large bag every 8 hours. Empty the small bag when it is about  full.  · Keep the drainage bag below the level of the catheter. This keeps urine from flowing backwards.  · Hold the bag over the toilet or another container. Release the valve (spigot) at the bottom of the bag. Do not touch the opening of the spigot. Do not let the opening touch the toilet or container.  · Close the spigot tightly when the bag is empty.  Cleaning the collection bag  · Clean the bag every few days.  ¨ First, wash your hands.  ¨ Disconnect the tubing from the catheter. Replace the used bag with a new bag. Then you can clean the used one.  ¨ Empty the used bag completely. Rinse it out with warm water and soap or fill the bag with water and add 1 teaspoon of vinegar. Let it sit for about 30 minutes. Then drain.  · The bag should be completely dry before storing it. Put it inside a plastic bag to keep it clean.  Checking everything  · Always make sure there are no kinks in the catheter or tubing.  · Always make sure there are no leaks in the catheter, tubing, or collection bag.  RISKS AND COMPLICATIONS  · Urine flow can become blocked. This can happen if the catheter or tubes are not working right. A blood clot can also block urine flow.  · The catheter might irritate tissue in your body. This can cause bleeding.  · The skin near the opening for the catheter may become irritated or infected.  · Bacteria may get into your bladder. This can cause a urinary tract infection.  HOME CARE INSTRUCTIONS  · Take all medicines prescribed by your caregiver. Follow the directions carefully.  · Drink 8 glasses of water every day. This produces good urine flow.  · Check the skin around your catheter a few times every day. Watch for redness and swelling. Look for  any fluids coming out of the opening.  · Do not use powder or cream around the catheter opening.  · Do not take tub baths or use pools or hot tubs.  · Keep all follow-up appointments.  SEEK MEDICAL CARE IF:  · You leak urine.  · Your skin around the catheter becomes red or sore.  · Your urine flow slows down.  · Your urine gets cloudy or smelly.  SEEK IMMEDIATE MEDICAL CARE IF:   · You have chills, nausea, or back pain.  · You have trouble changing your catheter.  · Your catheter comes out.  · You have blood in your urine.  · You have no urine flow for 1 hour.  · You have a fever.     This information is not intended to replace advice given to you by your health care provider. Make sure you discuss any questions you have with your health care provider.     Document Released: 09/04/2012 Document Revised: 05/03/2016 Document Reviewed: 09/04/2012  [employer] Interactive Patient Education ©2016 [employer]    Catéter suprapúbico - Guía para el hogar   (Suprapubic Catheter Home Guide)  Un catéter suprapúbico es un tubo de goma con un pequeño balón en la punta. Se utiliza para drenar la orina de la vejiga. [place] catéter se coloca en la vejiga a través de cherrie pequeña abertura en la parte central inferior del abdomen. Suprapúbico se refiere al área que se encuentra susu arriba del hueso púbico.   El globo en el extremo del catéter se llena con agua kimberly de gérmenes (estéril) . [place] que el catéter se salga. Cuando el catéter está en benito lugar, la orina fluirá hacia cherrie bolsa de recolección. Puede poner la bolsa al lado de la cama por la noche o sujetarla a la pierna earnest el día.   CÓMO CUIDAR EL CATÉTER   Limpie la piel  · Limpie la piel que está alrededor de la abertura del catéter.  ¨ Lave dayton benji con agua y jabón.  ¨ Limpie la piel que rodea la abertura con un paño limpio y agua jabonosa. No tire del tubo.  ¨ Seque yeny el área con cherrie toalla limpia dando golpecitos.  · El médico le hará cherrie curación (vendaje)  sobre la rafal. No use lociones o cremas excepto que benito médico lo indique.  Limpie el catéter   · Pregúntele a benito médico si necesita limpiar el catéter y con qué frecuencia.  · Use sólo jabón y agua.  · Puede simona costras sobre el catéter. Ponga el peróxido de hidrógeno en cherrie aakash de algodón o gasa para eliminar cualquier costra.    Vaciado de la bolsa   · Es posible que tenga cherrie bolsa de drenaje rehana para usar por la noche y cherrie más pequeña para el día. Vacíe la bolsa rehana cada 8 horas. Vacíe la bolsa pequeña cuando esté llena en  de benito capacidad.  · Mantenga la bolsa de drenaje yeny por debajo del nivel de la vejiga. [place] impide que la orina fluya hacia atrás.  · Sostenga la bolsa en el inodoro u otro recipiente. Libere la válvula (grifo) que se encuentra en la parte inferior de la bolsa. No toque la abertura del grifo. No deje que la abertura toque el inodoro o el recipiente.  · Cierre el grifo con fuerza cuando la bolsa está vacía.  Limpieza de la bolsa de drenaje   · Limpie la bolsa cada algunos pocos días.  ¨ [place] lávese yeny las benji.  ¨ Desconecte el tubo del catéter. Reemplace la bolsa usada con cherrie nueva bolsa. Entonces podrá limpiar la bolsa usada.  ¨ Vacíe la bolsa completamente. Lave con agua tibia y jabón o llene la bolsa con agua y añada 1 cucharadita de vinagre. Dejar reposar earnest unos 30 minutos. Luego escurra.  · La bolsa debe estar completamente seca antes de guardarla. Guárdela en cherrie bolsa de plástico para mantenerla limpia.  Controle todo   · Asegúrese siempre de que no haya torceduras en el catéter o el tubo.  · Asegúrese siempre de que no haya fugas en el catéter, los tubos, o la bolsa.  RIESGOS Y COMPLICACIONES  · El flujo de orina se puede obstruir. [place] puede suceder si el catéter o los tubos no están funcionando yeny. Un coágulo de gio también puede obstruir el flujo de orina.  · El catéter puede irritar los tejidos de benito cuerpo. Puede ocasionar hemorragias.  · La piel  alrededor de la abertura del catéter puede llegar a irritarse o infectarse.  · Las bacterias pueden entrar en la vejiga. [place] podría favorecer cherrie infección del tracto urinario.  INSTRUCCIONES PARA EL CUIDADO EN EL HOGAR   · [place] los medicamentos jordna le indicó el médico. Siga cuidadosamente las indicaciones.  · [place] 8 vasos grandes de agua por día. [place] favorece el flujo de la orina.  · Controle la piel que rodea el catéter varias veces por día. Observe señales de enrojecimiento e inflamación. [place]isel si hay pérdida de líquido por la abertura.  · No se aplique polvos ni cremas alrededor de la abertura.  · No tome gina, no practique natación ni utilice el jacuzzi.  · Cumpla con las visitas de control kai jordan se le indicó.  SOLICITE ATENCIÓN MÉDICA SI:   · Pierde orina.  · La piel que rodea el catéter enrojece o se vuelve sensible.  · La orina fluye hacia abajo.  · La orina es de color turbio o huele mal.  SOLICITE ATENCIÓN MÉDICA DE INMEDIATO SI:   · Siente escalofríos, náuseas o dolor en la espalda.  · Tiene dificultad para cambiar el catéter.  · El catéter se sale del lugar.  · Usted presenta gio en la orina.  · No observa flujo de orina earnest 1 hora.  · Tiene fiebre.     Esta información no tiene jordan fin reemplazar el consejo del médico. Asegúrese de hacerle al médico cualquier pregunta que tenga.     Document Released: 12/06/2012 Document Revised: 05/03/2016  [employer] Interactive Patient Education ©2016 [employer] Inc.      Harris Catheter Care, Adult  A Harris catheter is a soft, flexible tube that is placed into the bladder to drain urine. A Harris catheter may be inserted if:  · You leak urine or are not able to control when you urinate (urinary incontinence).  · You are not able to urinate when you need to (urinary retention).  · You had prostate surgery or surgery on the genitals.  · You have certain medical conditions, such as multiple sclerosis, dementia, or a spinal cord injury.  If you are going home  with a Harris catheter in place, follow the instructions below.  TAKING CARE OF THE CATHETER  1. Wash your hands with soap and water.  2. Using mild soap and warm water on a clean washcloth:  ¨ Clean the area on your body closest to the catheter insertion site using a circular motion, moving away from the catheter. Never wipe toward the catheter because this could sweep bacteria up into the urethra and cause infection.  ¨ Remove all traces of soap. Pat the area dry with a clean towel. For males, reposition the foreskin.  3. Attach the catheter to your leg so there is no tension on the catheter. Use adhesive tape or a leg strap. If you are using adhesive tape, remove any sticky residue left behind by the previous tape you used.  4. Keep the drainage bag below the level of the bladder, but keep it off the floor.  5. Check throughout the day to be sure the catheter is working and urine is draining freely. Make sure the tubing does not become kinked.  6. Do not pull on the catheter or try to remove it. Pulling could damage internal tissues.  TAKING CARE OF THE DRAINAGE BAGS  You will be given two drainage bags to take home. One is a large overnight drainage bag, and the other is a smaller leg bag that fits underneath clothing. You may wear the overnight bag at any time, but you should never wear the smaller leg bag at night. Follow the instructions below for how to empty, change, and clean your drainage bags.  Emptying the Drainage Bag  You must empty your drainage bag when it is  -½ full or at least 2-3 times a day.  1. Wash your hands with soap and water.  2. Keep the drainage bag below your hips, below the level of your bladder. This stops urine from going back into the tubing and into your bladder.  3. Hold the dirty bag over the toilet or a clean container.  4. Open the pour spout at the bottom of the bag and empty the urine into the toilet or container. Do not let the pour spout touch the toilet, container, or any  other surface. Doing so can place bacteria on the bag, which can cause an infection.  5. Clean the pour spout with a gauze pad or cotton ball that has rubbing alcohol on it.  6. Close the pour spout.  7. Attach the bag to your leg with adhesive tape or a leg strap.  8. Wash your hands well.  Changing the Drainage Bag  Change your drainage bag once a month or sooner if it starts to smell bad or look dirty. Below are steps to follow when changing the drainage bag.  1. Wash your hands with soap and water.  2. Pinch off the rubber catheter so that urine does not spill out.  3. Disconnect the catheter tube from the drainage tube at the connection valve. Do not let the tubes touch any surface.  4. Clean the end of the catheter tube with an alcohol wipe. Use a different alcohol wipe to clean the end of the drainage tube.  5. Connect the catheter tube to the drainage tube of the clean drainage bag.  6. Attach the new bag to the leg with adhesive tape or a leg strap. Avoid attaching the new bag too tightly.  7. Wash your hands well.  Cleaning the Drainage Bag  1. Wash your hands with soap and water.  2. Wash the bag in warm, soapy water.  3. Rinse the bag thoroughly with warm water.  4. Fill the bag with a solution of white vinegar and water (1 cup vinegar to 1 qt warm water [.2 L vinegar to 1 L warm water]). Close the bag and soak it for 30 minutes in the solution.  5. Rinse the bag with warm water.  6. Hang the bag to dry with the pour spout open and hanging downward.  7. Store the clean bag (once it is dry) in a clean plastic bag.  8. Wash your hands well.  PREVENTING INFECTION  · Wash your hands before and after handling your catheter.  · Take showers daily and wash the area where the catheter enters your body. Do not take baths. Replace wet leg straps with dry ones, if this applies.  · Do not use powders, sprays, or lotions on the genital area. Only use creams, lotions, or ointments as directed by your caregiver.  · For  females, wipe from front to back after each bowel movement.  · Drink enough fluids to keep your urine clear or pale yellow unless you have a fluid restriction.  · Do not let the drainage bag or tubing touch or lie on the floor.  · Wear cotton underwear to absorb moisture and to keep your .  SEEK MEDICAL CARE IF:   · Your urine is cloudy or smells unusually bad.  · Your catheter becomes clogged.  · You are not draining urine into the bag or your bladder feels full.  · Your catheter starts to leak.  SEEK IMMEDIATE MEDICAL CARE IF:   · You have pain, swelling, redness, or pus where the catheter enters the body.  · You have pain in the abdomen, legs, lower back, or bladder.  · You have a fever.  · You see blood fill the catheter, or your urine is pink or red.  · You have nausea, vomiting, or chills.  · Your catheter gets pulled out.  MAKE SURE YOU:   · Understand these instructions.  · Will watch your condition.  · Will get help right away if you are not doing well or get worse.     This information is not intended to replace advice given to you by your health care provider. Make sure you discuss any questions you have with your health care provider.     Document Released: 12/18/2006 Document Revised: 05/04/2015 Document Reviewed: 12/09/2013  [employer] Interactive Patient Education ©2016 [employer] Inc.      Cuidados del catéter Harris - Adultos  (Harris Catheter Care, Adult)  Un catéter Harris es un tubo bruno que se coloca en la vejiga para drenar la orina. Se colocará si:   · Pierde orina y no puede controlar cuándo orina (incontinencia urinaria).  · No puede orinar cuando lo necesita (retención).  · Fue sometido a cherrie cirugía de próstata o en los genitales.  · Sufre alguna afección jordan la esclerosis múltiple, demencia o lesión de la médula welch.  Si le dan el tammi con un catéter de Harris colocado, siga las siguientes instrucciones.   CUIDADOS DEL CATÉTER  7. Lave dayton benji con agua y jabón.  8. Use un  jabón suave y agua tibia en un paño limpio.  ¨ Limpie la rafal que rodea el sitio de inserción del catéter mediante un movimiento circular, alejándose de la sonda. Nunca limpie hacia el catéter ya que jordan barrido puede hacer ingresar bacterias en la uretra y causar infección.  ¨ Retire todos los restos de jabón. Seque yeny el área con bridget toalla limpia dando golpecitos. En el travis de los hombres, lleve el prepucio a benito lugar.  9. Adjunte el catéter a la pierna de modo que no haya tensión en el catéter. Use cinta adhesiva o bridget malcolm para unirlo a la pierna. Si usa bridget cinta adhesiva, elimine el residuo pegajoso que haya dejado la cinta anterior.  10. Mantenga la bolsa de drenaje debajo del nivel de la vejiga, kamilla lejos del suelo.  11. Controle earnest el día para asegurarse de que el catéter esté funcionando y la orina drene libremente. Asegúrese de que el tubo no se retuerza.  12. No tire del catéter ni trate de quitarlo. El tironeo puede dañar los tejidos internos.  CUIDADOS DE LA BOLSA DE DRENAJE  Le darán dos bolsas de drenaje para que lleve a benito casa. Bridget de ellas es bridget bolsa rehana de drenaje para usar earnest la noche y la otra es bridget bolsa de pierna de alex tamaño, que se ajusta debajo de la ropa. Usted puede usar la bolsa de la noche en cualquier momento, kamilla nunca debe usar la bolsa más pequeña earnest la noche. Siga las siguientes instrucciones para saber cómo vaciar, cambiar y limpiar dayton bolsas de drenaje.   Vaciado de la bolsa de drenaje  Debe vaciar la bolsa de drenaje cuando esté a  - ½ completa o al menos 2 a 3 veces al día.   9. Lave dayton benji con agua y jabón.  10. Mantenga la bolsa de drenaje yeny por debajo del nivel de la vejiga. [place] impide que la orina vuelva al tubo y a la vejiga.  11. Sostenga la bolsa en el inodoro u otro recipiente.  12. Jewel jerod vertedor de la parte inferior de la bolsa y vacíe la orina en el inodoro o un recipiente. No deje que el jerod vertedor toque el inodoro,  el contenedor o cualquier otra superficie. Si lo hace, las bacterias pueden ingresar a la bolsa y causar cherrie infección.  13. Limpie la boca de vertidor con cherrie gasa o algodón con alcohol.  14. Cierre el jerod vertedor.  15. Cherrie la bolsa a la pierna con cinta adhesiva o con cherrie malcolm para la pierna.  16. Lávese yeny las benji.  Cambio de la bolsa de drenaje  Cambie la bolsa de drenaje cherrie vez al mes o antes si empieza a oler mal o la ve sucia. A continuación verá los pasos a seguir cuando deba cambiar la bolsa de drenaje.   8. Lave dayton benji con agua y jabón.  9. Comprima el catéter de goma para que la orina no se derrame.  10. Desconecte el tubo del catéter del tubo de drenaje en la conexión de la válvula. No deje que los tubos toquen ninguna superficie.  11. Limpie el extremo del catéter con un hisopo con alcohol. Use un hisopo con alcohol diferente para limpiar el extremo del tubo de drenaje.  12. Conecte el tubo del catéter al tubo de drenaje de la bolsa limpia.  13. Adhiera la nueva bolsa a la pierna con cinta adhesiva o un broche. Evite ajustar mucho la nueva bolsa.  14. Lávese yeny las benji.  Limpieza de la bolsa de drenaje  9. Lave dayton benji con agua y jabón.  10. Lave la bolsa con agua tibia jabonosa.  11. Enjuáguela cuidadosamente con agua tibia.  12. Llene la bolsa con cherrie solución de vinagre aguilar y agua (1 taza de vinagre y 950 cc de agua tibia [200 cc vinagre en 1 L de agua tibia). Cierre la bolsa y déjela en remojo earnest 30 minutos con la solución.  13. Enjuague la bolsa con [place].  14. Cuelgue la bolsa para que se seque con el jerod vertedor abierta y colgando hacia abajo.  15. Guarde la bolsa limpia (cherrie vez seca) y cherrie bolsa de plástico limpia.  16. Lávese yeny las benji.  PREVENCIÓN DE INFECCIONES   · Lávese las benji antes y después de manipular el catéter.  · [place] duchas diariamente y lave el área en la que el catéter ingresa a benito cuerpo. No tome gina. Reemplace las correas mojadas  por correas secas, si corresponde.  · No use talcos, aerosoles o lociones en el área genital. Sólo use cremas, lociones o ungüentos jordan le indique benito médico.  · Las mujeres deben higienizarse de adelante hacia atrás después de  el intestino.  · [place] suficiente líquido para mantener la orina willie o de color amarillo pálido, excepto si tiene restricción de líquidos.  · No deje que la bolsa de drenaje ni los tubos toquen el suelo.  · Use ropa interior de algodón para absorber la humedad y para mantener benito piel más seca.  SOLICITE ATENCIÓN MÉDICA SI:   · La orina es turbia o huele mal.  · El catéter se obstruye.  · No drena orina hacia la bolsa o siente la vejiga llena.  · El catéter comienza a perder.  SOLICITE ATENCIÓN MÉDICA DE INMEDIATO SI:   · Tiene dolor, enrojecimiento inflamación u observa pus en el sitio de ingreso del catéter.  · Siente dolor en el abdomen, las piernas, la cintura o la vejiga.  · Tiene fiebre.  · Observa gio en el catéter o la orina es de color ochoa o esther.  · Tiene náuseas, vómitos o escalofríos.  · El catéter se sale.  ASEGÚRESE DE QUE:   · Comprende estas instrucciones.  · Controlará benito enfermedad.  · Solicitará ayuda de inmediato si no mejora o si empeora.     Esta información no tiene jordan fin reemplazar el consejo del médico. Asegúrese de hacerle al médico cualquier pregunta que tenga.     Document Released: 12/18/2006 Document Revised: 01/08/2016  [employer] Interactive Patient Education ©2016 [employer]      Incision Care  An incision is when a surgeon cuts into your body. After surgery, the incision needs to be cared for properly to prevent infection.   HOW TO CARE FOR YOUR INCISION  · Take medicines only as directed by your health care provider.  · There are many different ways to close and cover an incision, including stitches, skin glue, and adhesive strips. Follow your health care provider's instructions on:  ¨ Incision care.  ¨ Bandage (dressing) changes and  removal.  ¨ Incision closure removal.  · Do not take baths, swim, or use a hot tub until your health care provider approves. You may shower as directed by your health care provider.  · Resume your normal diet and activities as directed.  · Use anti-itch medicine (such as an antihistamine) as directed by your health care provider. The incision may itch while it is healing. Do not pick or scratch at the incision.  · Drink enough fluid to keep your urine clear or pale yellow.  SEEK MEDICAL CARE IF:   · You have drainage, redness, swelling, or pain at your incision site.  · You have muscle aches, chills, or a general ill feeling.  · You notice a bad smell coming from the incision or dressing.  · Your incision edges separate after the sutures, staples, or skin adhesive strips have been removed.  · You have persistent nausea or vomiting.  · You have a fever.  · You are dizzy.  SEEK IMMEDIATE MEDICAL CARE IF:   · You have a rash.  · You faint.  · You have difficulty breathing.  MAKE SURE YOU:   · Understand these instructions.  · Will watch your condition.  · Will get help right away if you are not doing well or get worse.     This information is not intended to replace advice given to you by your health care provider. Make sure you discuss any questions you have with your health care provider.     Document Released: 07/07/2006 Document Revised: 01/08/2016 Document Reviewed: 02/11/2015  [employer] Interactive Patient Education ©2016 [employer]      Cuidados de la incisión  (Incision Care)   Se llama incisión al andrei que hace el cirujano en los tejidos. Luego de la cirugía, la incisión debe cuidarse adecuadamente para evitar infecciones.   INSTRUCCIONES PARA EL CUIDADO DOMICILIARIO  · [place] todos los medicamentos kai jordan se los treviño prescripto. Solo tome medicamentos de venta kimberly o recetados para el dolor, malestar o la fiebre, jordan le indica el médico.  · No retire el vendaje ni moje la herida hasta que el cirujano lo  autorice. Si el vendaje se moja, se ensucia o comienza a silvio mal olor, cámbielo y comuníquese con el cirujano lo antes posible para que le dé instrucciones.  · Puede yosef cherrie ducha. No tome gina de inmersión, no practique natación ni dionicio nada que remoje la herida hasta que se cure.  · Puede reanudar benito dieta y dayton actividades

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
## 2017-10-30 NOTE — PROGRESS NOTES
Pt discharged home. Left [id number] via via wheelchair with CNA. Discharge instructions provided in [place] with  line and prescriptions provided prior to departure.
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
## 2017-11-04 RX ORDER — SODIUM CHLORIDE 9 MG/ML
INJECTION, SOLUTION INTRAVENOUS
Status: DISPENSED
Start: 2017-11-04 | End: 2017-11-05

## 2017-11-09 ENCOUNTER — RESOLUTE PROFESSIONAL BILLING HOSPITAL PROF FEE (OUTPATIENT)
Dept: HOSPITALIST | Facility: MEDICAL CENTER | Age: 27
End: 2017-11-09
Payer: COMMERCIAL

## 2017-11-09 ENCOUNTER — HOSPITAL ENCOUNTER (OUTPATIENT)
Facility: MEDICAL CENTER | Age: 27
End: 2017-11-10
Attending: EMERGENCY MEDICINE | Admitting: HOSPITALIST
Payer: COMMERCIAL

## 2017-11-09 ENCOUNTER — APPOINTMENT (OUTPATIENT)
Dept: RADIOLOGY | Facility: MEDICAL CENTER | Age: 27
End: 2017-11-09
Attending: EMERGENCY MEDICINE
Payer: COMMERCIAL

## 2017-11-09 DIAGNOSIS — R33.8 ACUTE URINARY RETENTION: ICD-10-CM

## 2017-11-09 DIAGNOSIS — R31.9 URINARY TRACT INFECTION WITH HEMATURIA, SITE UNSPECIFIED: ICD-10-CM

## 2017-11-09 DIAGNOSIS — N49.2 SCROTAL ABSCESS: ICD-10-CM

## 2017-11-09 DIAGNOSIS — N39.0 URINARY TRACT INFECTION WITH HEMATURIA, SITE UNSPECIFIED: ICD-10-CM

## 2017-11-09 PROBLEM — N13.9 URINARY OBSTRUCTION: Status: ACTIVE | Noted: 2017-11-09

## 2017-11-09 PROBLEM — N50.89 SWELLING, SCROTUM: Status: ACTIVE | Noted: 2017-11-09

## 2017-11-09 LAB
ANION GAP SERPL CALC-SCNC: 8 MMOL/L (ref 0–11.9)
APPEARANCE UR: ABNORMAL
APPEARANCE UR: ABNORMAL
BACTERIA #/AREA URNS HPF: ABNORMAL /HPF
BACTERIA #/AREA URNS HPF: ABNORMAL /HPF
BASOPHILS # BLD AUTO: 0.3 % (ref 0–1.8)
BASOPHILS # BLD: 0.04 K/UL (ref 0–0.12)
BILIRUB UR QL STRIP.AUTO: NEGATIVE
BILIRUB UR QL STRIP.AUTO: NEGATIVE
BUN SERPL-MCNC: 10 MG/DL (ref 8–22)
CALCIUM SERPL-MCNC: 8.9 MG/DL (ref 8.5–10.5)
CHLORIDE SERPL-SCNC: 107 MMOL/L (ref 96–112)
CO2 SERPL-SCNC: 24 MMOL/L (ref 20–33)
COLOR UR: ABNORMAL
COLOR UR: ABNORMAL
CREAT SERPL-MCNC: 0.63 MG/DL (ref 0.5–1.4)
CULTURE IF INDICATED INDCX: YES UA CULTURE
EOSINOPHIL # BLD AUTO: 0.19 K/UL (ref 0–0.51)
EOSINOPHIL NFR BLD: 1.6 % (ref 0–6.9)
EPI CELLS #/AREA URNS HPF: ABNORMAL /HPF
EPI CELLS #/AREA URNS HPF: NEGATIVE /HPF
ERYTHROCYTE [DISTWIDTH] IN BLOOD BY AUTOMATED COUNT: 42.7 FL (ref 35.9–50)
GFR SERPL CREATININE-BSD FRML MDRD: >60 ML/MIN/1.73 M 2
GLUCOSE SERPL-MCNC: 107 MG/DL (ref 65–99)
GLUCOSE UR STRIP.AUTO-MCNC: NEGATIVE MG/DL
GLUCOSE UR STRIP.AUTO-MCNC: NEGATIVE MG/DL
HCT VFR BLD AUTO: 40.4 % (ref 42–52)
HGB BLD-MCNC: 13.9 G/DL (ref 14–18)
HYALINE CASTS #/AREA URNS LPF: ABNORMAL /LPF
HYALINE CASTS #/AREA URNS LPF: ABNORMAL /LPF
IMM GRANULOCYTES # BLD AUTO: 0.08 K/UL (ref 0–0.11)
IMM GRANULOCYTES NFR BLD AUTO: 0.7 % (ref 0–0.9)
KETONES UR STRIP.AUTO-MCNC: NEGATIVE MG/DL
KETONES UR STRIP.AUTO-MCNC: NEGATIVE MG/DL
LEUKOCYTE ESTERASE UR QL STRIP.AUTO: ABNORMAL
LEUKOCYTE ESTERASE UR QL STRIP.AUTO: ABNORMAL
LYMPHOCYTES # BLD AUTO: 1.47 K/UL (ref 1–4.8)
LYMPHOCYTES NFR BLD: 12.5 % (ref 22–41)
MCH RBC QN AUTO: 31.4 PG (ref 27–33)
MCHC RBC AUTO-ENTMCNC: 34.4 G/DL (ref 33.7–35.3)
MCV RBC AUTO: 91.4 FL (ref 81.4–97.8)
MICRO URNS: ABNORMAL
MICRO URNS: ABNORMAL
MONOCYTES # BLD AUTO: 0.56 K/UL (ref 0–0.85)
MONOCYTES NFR BLD AUTO: 4.8 % (ref 0–13.4)
MUCOUS THREADS #/AREA URNS HPF: ABNORMAL /HPF
NEUTROPHILS # BLD AUTO: 9.44 K/UL (ref 1.82–7.42)
NEUTROPHILS NFR BLD: 80.1 % (ref 44–72)
NITRITE UR QL STRIP.AUTO: POSITIVE
NITRITE UR QL STRIP.AUTO: POSITIVE
NRBC # BLD AUTO: 0 K/UL
NRBC BLD AUTO-RTO: 0 /100 WBC
PH UR STRIP.AUTO: 6 [PH]
PH UR STRIP.AUTO: 6 [PH]
PLATELET # BLD AUTO: 289 K/UL (ref 164–446)
PMV BLD AUTO: 9.3 FL (ref 9–12.9)
POTASSIUM SERPL-SCNC: 3.7 MMOL/L (ref 3.6–5.5)
PROT UR QL STRIP: 100 MG/DL
PROT UR QL STRIP: >=300 MG/DL
RBC # BLD AUTO: 4.42 M/UL (ref 4.7–6.1)
RBC # URNS HPF: ABNORMAL /HPF
RBC # URNS HPF: ABNORMAL /HPF
RBC UR QL AUTO: ABNORMAL
RBC UR QL AUTO: ABNORMAL
SODIUM SERPL-SCNC: 139 MMOL/L (ref 135–145)
SP GR UR STRIP.AUTO: 1.01
SP GR UR STRIP.AUTO: 1.03
TRANS CELLS #/AREA URNS HPF: ABNORMAL /HPF
UROBILINOGEN UR STRIP.AUTO-MCNC: 0.2 MG/DL
UROBILINOGEN UR STRIP.AUTO-MCNC: NORMAL MG/DL
WBC # BLD AUTO: 11.8 K/UL (ref 4.8–10.8)
WBC #/AREA URNS HPF: ABNORMAL /HPF
WBC #/AREA URNS HPF: ABNORMAL /HPF

## 2017-11-09 PROCEDURE — 80048 BASIC METABOLIC PNL TOTAL CA: CPT

## 2017-11-09 PROCEDURE — 76870 US EXAM SCROTUM: CPT

## 2017-11-09 PROCEDURE — 700111 HCHG RX REV CODE 636 W/ 250 OVERRIDE (IP): Performed by: EMERGENCY MEDICINE

## 2017-11-09 PROCEDURE — 85025 COMPLETE CBC W/AUTO DIFF WBC: CPT

## 2017-11-09 PROCEDURE — 87205 SMEAR GRAM STAIN: CPT | Mod: 91

## 2017-11-09 PROCEDURE — G0378 HOSPITAL OBSERVATION PER HR: HCPCS

## 2017-11-09 PROCEDURE — A9270 NON-COVERED ITEM OR SERVICE: HCPCS | Performed by: HOSPITALIST

## 2017-11-09 PROCEDURE — 96374 THER/PROPH/DIAG INJ IV PUSH: CPT

## 2017-11-09 PROCEDURE — 87186 SC STD MICRODIL/AGAR DIL: CPT | Mod: 91

## 2017-11-09 PROCEDURE — 81001 URINALYSIS AUTO W/SCOPE: CPT

## 2017-11-09 PROCEDURE — 87086 URINE CULTURE/COLONY COUNT: CPT | Mod: 91

## 2017-11-09 PROCEDURE — 87077 CULTURE AEROBIC IDENTIFY: CPT | Mod: 91

## 2017-11-09 PROCEDURE — 87070 CULTURE OTHR SPECIMN AEROBIC: CPT

## 2017-11-09 PROCEDURE — 51798 US URINE CAPACITY MEASURE: CPT

## 2017-11-09 PROCEDURE — 99218 PR INITIAL OBSERVATION CARE,LEVL I: CPT | Performed by: HOSPITALIST

## 2017-11-09 PROCEDURE — 700102 HCHG RX REV CODE 250 W/ 637 OVERRIDE(OP): Performed by: HOSPITALIST

## 2017-11-09 PROCEDURE — 99285 EMERGENCY DEPT VISIT HI MDM: CPT

## 2017-11-09 PROCEDURE — 36415 COLL VENOUS BLD VENIPUNCTURE: CPT

## 2017-11-09 RX ORDER — ONDANSETRON 4 MG/1
4 TABLET, ORALLY DISINTEGRATING ORAL EVERY 4 HOURS PRN
Status: DISCONTINUED | OUTPATIENT
Start: 2017-11-09 | End: 2017-11-10 | Stop reason: HOSPADM

## 2017-11-09 RX ORDER — CEFTRIAXONE 2 G/1
2 INJECTION, POWDER, FOR SOLUTION INTRAMUSCULAR; INTRAVENOUS ONCE
Status: COMPLETED | OUTPATIENT
Start: 2017-11-09 | End: 2017-11-09

## 2017-11-09 RX ORDER — BISACODYL 10 MG
10 SUPPOSITORY, RECTAL RECTAL
Status: DISCONTINUED | OUTPATIENT
Start: 2017-11-09 | End: 2017-11-10 | Stop reason: HOSPADM

## 2017-11-09 RX ORDER — OXYCODONE HYDROCHLORIDE 5 MG/1
5 TABLET ORAL EVERY 4 HOURS PRN
Status: DISCONTINUED | OUTPATIENT
Start: 2017-11-09 | End: 2017-11-10 | Stop reason: HOSPADM

## 2017-11-09 RX ORDER — ACETAMINOPHEN 325 MG/1
650 TABLET ORAL EVERY 6 HOURS PRN
Status: DISCONTINUED | OUTPATIENT
Start: 2017-11-09 | End: 2017-11-10 | Stop reason: HOSPADM

## 2017-11-09 RX ORDER — ACETAMINOPHEN 500 MG
500 TABLET ORAL EVERY 6 HOURS PRN
COMMUNITY
End: 2017-11-24

## 2017-11-09 RX ORDER — PROMETHAZINE HYDROCHLORIDE 25 MG/1
12.5-25 SUPPOSITORY RECTAL EVERY 4 HOURS PRN
Status: DISCONTINUED | OUTPATIENT
Start: 2017-11-09 | End: 2017-11-10 | Stop reason: HOSPADM

## 2017-11-09 RX ORDER — AMOXICILLIN 250 MG
2 CAPSULE ORAL 2 TIMES DAILY
Status: DISCONTINUED | OUTPATIENT
Start: 2017-11-09 | End: 2017-11-10 | Stop reason: HOSPADM

## 2017-11-09 RX ORDER — PROMETHAZINE HYDROCHLORIDE 25 MG/1
12.5-25 TABLET ORAL EVERY 4 HOURS PRN
Status: DISCONTINUED | OUTPATIENT
Start: 2017-11-09 | End: 2017-11-10 | Stop reason: HOSPADM

## 2017-11-09 RX ORDER — ONDANSETRON 2 MG/ML
4 INJECTION INTRAMUSCULAR; INTRAVENOUS EVERY 4 HOURS PRN
Status: DISCONTINUED | OUTPATIENT
Start: 2017-11-09 | End: 2017-11-10 | Stop reason: HOSPADM

## 2017-11-09 RX ORDER — OXYCODONE HYDROCHLORIDE 10 MG/1
5-10 TABLET ORAL EVERY 4 HOURS PRN
COMMUNITY
End: 2017-11-24

## 2017-11-09 RX ORDER — POLYETHYLENE GLYCOL 3350 17 G/17G
1 POWDER, FOR SOLUTION ORAL
Status: DISCONTINUED | OUTPATIENT
Start: 2017-11-09 | End: 2017-11-10 | Stop reason: HOSPADM

## 2017-11-09 RX ADMIN — OXYCODONE HYDROCHLORIDE 5 MG: 5 TABLET ORAL at 21:20

## 2017-11-09 RX ADMIN — CEFTRIAXONE SODIUM 2 G: 2 INJECTION, POWDER, FOR SOLUTION INTRAMUSCULAR; INTRAVENOUS at 17:04

## 2017-11-09 ASSESSMENT — PAIN SCALES - GENERAL: PAINLEVEL_OUTOF10: 8

## 2017-11-09 ASSESSMENT — ENCOUNTER SYMPTOMS
SHORTNESS OF BREATH: 0
COUGH: 0
CHILLS: 0
FEVER: 0

## 2017-11-09 ASSESSMENT — PATIENT HEALTH QUESTIONNAIRE - PHQ9
SUM OF ALL RESPONSES TO PHQ9 QUESTIONS 1 AND 2: 0
2. FEELING DOWN, DEPRESSED, IRRITABLE, OR HOPELESS: NOT AT ALL
SUM OF ALL RESPONSES TO PHQ QUESTIONS 1-9: 0
1. LITTLE INTEREST OR PLEASURE IN DOING THINGS: NOT AT ALL

## 2017-11-09 ASSESSMENT — LIFESTYLE VARIABLES
EVER_SMOKED: NEVER
DO YOU DRINK ALCOHOL: NO

## 2017-11-09 ASSESSMENT — PAIN SCALES - WONG BAKER: WONGBAKER_NUMERICALRESPONSE: HURTS A WHOLE LOT
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
## 2017-11-09 NOTE — ED NOTES
Chief Complaint   Patient presents with   • Urinary Catheter Problem     suprapubic catheter not flowing since last night     Triage process explained to patient.  Pt back to waiting room.  Pt instructed to inform RN if any changes or questions arise.
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
## 2017-11-10 ENCOUNTER — PATIENT OUTREACH (OUTPATIENT)
Dept: HEALTH INFORMATION MANAGEMENT | Facility: OTHER | Age: 27
End: 2017-11-10

## 2017-11-10 VITALS
WEIGHT: 194 LBS | SYSTOLIC BLOOD PRESSURE: 114 MMHG | RESPIRATION RATE: 19 BRPM | OXYGEN SATURATION: 97 % | HEIGHT: 74 IN | HEART RATE: 50 BPM | BODY MASS INDEX: 24.9 KG/M2 | DIASTOLIC BLOOD PRESSURE: 61 MMHG | TEMPERATURE: 98.4 F

## 2017-11-10 PROBLEM — S30.22XA SCROTAL HEMATOMA: Status: ACTIVE | Noted: 2017-11-10

## 2017-11-10 LAB
GRAM STN SPEC: NORMAL
GRAM STN SPEC: NORMAL
SIGNIFICANT IND 70042: NORMAL
SIGNIFICANT IND 70042: NORMAL
SITE SITE: NORMAL
SITE SITE: NORMAL
SOURCE SOURCE: NORMAL
SOURCE SOURCE: NORMAL

## 2017-11-10 PROCEDURE — 306015 LOCK STAT FOLEY: Performed by: HOSPITALIST

## 2017-11-10 PROCEDURE — 36415 COLL VENOUS BLD VENIPUNCTURE: CPT

## 2017-11-10 PROCEDURE — 99285 EMERGENCY DEPT VISIT HI MDM: CPT

## 2017-11-10 PROCEDURE — G0378 HOSPITAL OBSERVATION PER HR: HCPCS

## 2017-11-10 PROCEDURE — A9270 NON-COVERED ITEM OR SERVICE: HCPCS | Performed by: HOSPITALIST

## 2017-11-10 PROCEDURE — 99217 PR OBSERVATION CARE DISCHARGE: CPT | Performed by: HOSPITALIST

## 2017-11-10 PROCEDURE — 700102 HCHG RX REV CODE 250 W/ 637 OVERRIDE(OP): Performed by: HOSPITALIST

## 2017-11-10 RX ORDER — AMOXICILLIN AND CLAVULANATE POTASSIUM 875; 125 MG/1; MG/1
1 TABLET, FILM COATED ORAL 2 TIMES DAILY
Qty: 14 TAB | Refills: 0 | Status: ON HOLD | OUTPATIENT
Start: 2017-11-10 | End: 2017-11-27

## 2017-11-10 RX ADMIN — OXYCODONE HYDROCHLORIDE 5 MG: 5 TABLET ORAL at 03:58

## 2017-11-10 ASSESSMENT — PAIN SCALES - GENERAL
PAINLEVEL_OUTOF10: 0
PAINLEVEL_OUTOF10: 0
PAINLEVEL_OUTOF10: 8
PAINLEVEL_OUTOF10: 0

## 2017-11-10 ASSESSMENT — PAIN SCALES - WONG BAKER
WONGBAKER_NUMERICALRESPONSE: DOESN'T HURT AT ALL
WONGBAKER_NUMERICALRESPONSE: HURTS A WHOLE LOT

## 2017-11-10 ASSESSMENT — LIFESTYLE VARIABLES: DO YOU DRINK ALCOHOL: NO

## 2017-11-10 NOTE — ASSESSMENT & PLAN NOTE
The suprapubic catheter has been flushed and is now patent and flowing. Dr. Salcedo, urology, was called from the ER and is aware of his condition and the ultrasound results.
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
## 2017-11-10 NOTE — ASSESSMENT & PLAN NOTE
Ultrasound:  1.  There is a large complex fluid collection in the inferior scrotal region centrally as described. This could represent either resolving hematoma or abscess.  2.  Small subcentimeter focal hypoechoic heterogeneous area in the inferior left testicle. With a history of trauma this could be a focal area of contusion. Underlying mass is not excluded and follow-up is recommended
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
## 2017-11-10 NOTE — DISCHARGE INSTRUCTIONS
Discharge Instructions    Discharged to home by car with relative. Discharged via wheelchair, hospital escort: Yes.  Special equipment needed: Not Applicable    Be sure to schedule a follow-up appointment with your primary care doctor or any specialists as instructed.     Discharge Plan:   Diet Plan: Discussed  Activity Level: Discussed  Confirmed Follow up Appointment: Appointment Scheduled  Confirmed Symptoms Management: Discussed  Medication Reconciliation Updated: Yes  Influenza Vaccine Indication: Patient Refuses    I understand that a diet low in cholesterol, fat, and sodium is recommended for good health. Unless I have been given specific instructions below for another diet, I accept this instruction as my diet prescription.   Other diet: Heart healthy     Special Instructions: None    · Is patient discharged on Warfarin / Coumadin?   No     · Is patient Post Blood Transfusion?  No    Depression / Suicide Risk    As you are discharged from this [facility] facility, it is important to learn how to keep safe from harming yourself.    Recognize the warning signs:  · Abrupt changes in personality, positive or negative- including increase in energy   · Giving away possessions  · Change in eating patterns- significant weight changes-  positive or negative  · Change in sleeping patterns- unable to sleep or sleeping all the time   · Unwillingness or inability to communicate  · Depression  · Unusual sadness, discouragement and loneliness  · Talk of wanting to die  · Neglect of personal appearance   · Rebelliousness- reckless behavior  · Withdrawal from people/activities they love  · Confusion- inability to concentrate     If you or a loved one observes any of these behaviors or has concerns about self-harm, here's what you can do:  · Talk about it- your feelings and reasons for harming yourself  · Remove any means that you might use to hurt yourself (examples: pills, rope, extension cords, firearm)  · Get  professional help from the community (Mental Health, Substance Abuse, psychological counseling)  · Do not be alone:Call your Safe Contact- someone whom you trust who will be there for you.  · Call your local CRISIS HOTLINE 575-0644 or 628-415-4895  · Call your local Children's Mobile Crisis Response Team [place] (231) 191-1574 or www.[employer]  · Call the toll free National Suicide Prevention Hotlines   · National Suicide Prevention Lifeline 288-105-TKIX (1711)  · National Hope Line Network 800-SUICIDE (327-3072)

## 2017-11-10 NOTE — ED NOTES
Med rec completed per pt with family at bedside to translate  Allergies reviewed - NKDA  No oral ABX in last month

## 2017-11-10 NOTE — PROGRESS NOTES
Received from green pod, aox4,  steady on his feet. Denies pain or sob. Call light within reach. Needs attended. Plan of care discussed and understood.

## 2017-11-10 NOTE — ED NOTES
Urologist called - updated him on state of catheter. MD states that because catheter is flowing, he recommends hospitalist just take care of UTI, he may or may not stop by to check wound.

## 2017-11-10 NOTE — PROGRESS NOTES
Assessment completed.  Pt A&Ox4.  Respirations even, unlabored on room air.  Pt denies any pain at this time.  Suprapubic catheter present, cleaning not indicate not this time.    Call light within reach.  Pt updated on POC, updated communication board.   Needs met, will continue to monitor.  Hot meal provided
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
## 2017-11-10 NOTE — PROGRESS NOTES
IV dc'd.  Discharge instructions given to patient; patient verbalizes understanding, all questions answered.  Copy of DC summary provided, signed copy in chart.  1 prescription electronically sent to pt's pharmacy, pt verified correct pharmacy.   Pt states personal belongings are in possession.  Pt escorted off unit by unit clerk  without incident.

## 2017-11-11 LAB
BACTERIA UR CULT: ABNORMAL
BACTERIA WND AEROBE CULT: ABNORMAL
GRAM STN SPEC: ABNORMAL
GRAM STN SPEC: ABNORMAL
SIGNIFICANT IND 70042: ABNORMAL
SITE SITE: ABNORMAL
SOURCE SOURCE: ABNORMAL

## 2017-11-24 ENCOUNTER — APPOINTMENT (OUTPATIENT)
Dept: RADIOLOGY | Facility: MEDICAL CENTER | Age: 27
DRG: 698 | End: 2017-11-24
Attending: EMERGENCY MEDICINE
Payer: COMMERCIAL

## 2017-11-24 ENCOUNTER — HOSPITAL ENCOUNTER (EMERGENCY)
Facility: MEDICAL CENTER | Age: 27
End: 2017-11-24
Attending: EMERGENCY MEDICINE
Payer: COMMERCIAL

## 2017-11-24 ENCOUNTER — HOSPITAL ENCOUNTER (INPATIENT)
Facility: MEDICAL CENTER | Age: 27
LOS: 3 days | DRG: 698 | End: 2017-11-27
Attending: EMERGENCY MEDICINE | Admitting: INTERNAL MEDICINE
Payer: COMMERCIAL

## 2017-11-24 ENCOUNTER — RESOLUTE PROFESSIONAL BILLING HOSPITAL PROF FEE (OUTPATIENT)
Dept: HOSPITALIST | Facility: MEDICAL CENTER | Age: 27
End: 2017-11-24
Payer: COMMERCIAL

## 2017-11-24 VITALS
RESPIRATION RATE: 16 BRPM | DIASTOLIC BLOOD PRESSURE: 76 MMHG | TEMPERATURE: 98.3 F | SYSTOLIC BLOOD PRESSURE: 118 MMHG | BODY MASS INDEX: 25.07 KG/M2 | WEIGHT: 195.33 LBS | OXYGEN SATURATION: 98 % | HEART RATE: 84 BPM | HEIGHT: 74 IN

## 2017-11-24 DIAGNOSIS — T83.511A URINARY TRACT INFECTION ASSOCIATED WITH CATHETERIZATION OF URINARY TRACT, UNSPECIFIED INDWELLING URINARY CATHETER TYPE, INITIAL ENCOUNTER (HCC): ICD-10-CM

## 2017-11-24 DIAGNOSIS — N39.0 URINARY TRACT INFECTION ASSOCIATED WITH CATHETERIZATION OF URINARY TRACT, UNSPECIFIED INDWELLING URINARY CATHETER TYPE, INITIAL ENCOUNTER (HCC): ICD-10-CM

## 2017-11-24 DIAGNOSIS — T83.090A BLOCKED SUPRAPUBIC CATHETER, INITIAL ENCOUNTER (HCC): ICD-10-CM

## 2017-11-24 DIAGNOSIS — N39.0 URINARY TRACT INFECTION ASSOCIATED WITH CYSTOSTOMY CATHETER, SUBSEQUENT ENCOUNTER: ICD-10-CM

## 2017-11-24 DIAGNOSIS — T83.510D URINARY TRACT INFECTION ASSOCIATED WITH CYSTOSTOMY CATHETER, SUBSEQUENT ENCOUNTER: ICD-10-CM

## 2017-11-24 DIAGNOSIS — A41.9 SEPSIS, DUE TO UNSPECIFIED ORGANISM: ICD-10-CM

## 2017-11-24 DIAGNOSIS — N12 PYELONEPHRITIS: ICD-10-CM

## 2017-11-24 PROBLEM — Z93.59 SUPRAPUBIC CATHETER (HCC): Status: ACTIVE | Noted: 2017-11-24

## 2017-11-24 LAB
ALBUMIN SERPL BCP-MCNC: 4.5 G/DL (ref 3.2–4.9)
ALBUMIN/GLOB SERPL: 1.2 G/DL
ALP SERPL-CCNC: 89 U/L (ref 30–99)
ALT SERPL-CCNC: 32 U/L (ref 2–50)
AMORPH CRY #/AREA URNS HPF: PRESENT /HPF
ANION GAP SERPL CALC-SCNC: 10 MMOL/L (ref 0–11.9)
APPEARANCE UR: ABNORMAL
APPEARANCE UR: ABNORMAL
APTT PPP: 31.8 SEC (ref 24.7–36)
AST SERPL-CCNC: 20 U/L (ref 12–45)
BACTERIA #/AREA URNS HPF: ABNORMAL /HPF
BACTERIA #/AREA URNS HPF: ABNORMAL /HPF
BASOPHILS # BLD AUTO: 0.2 % (ref 0–1.8)
BASOPHILS # BLD: 0.04 K/UL (ref 0–0.12)
BILIRUB SERPL-MCNC: 1.9 MG/DL (ref 0.1–1.5)
BILIRUB UR QL STRIP.AUTO: NEGATIVE
BILIRUB UR QL STRIP.AUTO: NEGATIVE
BUN SERPL-MCNC: 11 MG/DL (ref 8–22)
CALCIUM SERPL-MCNC: 9.8 MG/DL (ref 8.5–10.5)
CHLORIDE SERPL-SCNC: 103 MMOL/L (ref 96–112)
CO2 SERPL-SCNC: 21 MMOL/L (ref 20–33)
COLOR UR: YELLOW
COLOR UR: YELLOW
CREAT SERPL-MCNC: 0.65 MG/DL (ref 0.5–1.4)
CULTURE IF INDICATED INDCX: YES UA CULTURE
EOSINOPHIL # BLD AUTO: 0.03 K/UL (ref 0–0.51)
EOSINOPHIL NFR BLD: 0.2 % (ref 0–6.9)
EPI CELLS #/AREA URNS HPF: NEGATIVE /HPF
EPI CELLS #/AREA URNS HPF: NEGATIVE /HPF
ERYTHROCYTE [DISTWIDTH] IN BLOOD BY AUTOMATED COUNT: 39.5 FL (ref 35.9–50)
GFR SERPL CREATININE-BSD FRML MDRD: >60 ML/MIN/1.73 M 2
GLOBULIN SER CALC-MCNC: 3.8 G/DL (ref 1.9–3.5)
GLUCOSE SERPL-MCNC: 104 MG/DL (ref 65–99)
GLUCOSE UR STRIP.AUTO-MCNC: NEGATIVE MG/DL
GLUCOSE UR STRIP.AUTO-MCNC: NEGATIVE MG/DL
HCT VFR BLD AUTO: 43.5 % (ref 42–52)
HGB BLD-MCNC: 15.2 G/DL (ref 14–18)
HYALINE CASTS #/AREA URNS LPF: ABNORMAL /LPF
HYALINE CASTS #/AREA URNS LPF: ABNORMAL /LPF
IMM GRANULOCYTES # BLD AUTO: 0.08 K/UL (ref 0–0.11)
IMM GRANULOCYTES NFR BLD AUTO: 0.4 % (ref 0–0.9)
INR PPP: 1.16 (ref 0.87–1.13)
KETONES UR STRIP.AUTO-MCNC: NEGATIVE MG/DL
KETONES UR STRIP.AUTO-MCNC: NEGATIVE MG/DL
LACTATE BLD-SCNC: 1.7 MMOL/L (ref 0.5–2)
LACTATE BLD-SCNC: 1.8 MMOL/L (ref 0.5–2)
LEUKOCYTE ESTERASE UR QL STRIP.AUTO: ABNORMAL
LEUKOCYTE ESTERASE UR QL STRIP.AUTO: ABNORMAL
LYMPHOCYTES # BLD AUTO: 1.2 K/UL (ref 1–4.8)
LYMPHOCYTES NFR BLD: 6.6 % (ref 22–41)
MCH RBC QN AUTO: 30.2 PG (ref 27–33)
MCHC RBC AUTO-ENTMCNC: 34.9 G/DL (ref 33.7–35.3)
MCV RBC AUTO: 86.5 FL (ref 81.4–97.8)
MICRO URNS: ABNORMAL
MICRO URNS: ABNORMAL
MONOCYTES # BLD AUTO: 1 K/UL (ref 0–0.85)
MONOCYTES NFR BLD AUTO: 5.5 % (ref 0–13.4)
NEUTROPHILS # BLD AUTO: 15.74 K/UL (ref 1.82–7.42)
NEUTROPHILS NFR BLD: 87.1 % (ref 44–72)
NITRITE UR QL STRIP.AUTO: POSITIVE
NITRITE UR QL STRIP.AUTO: POSITIVE
NRBC # BLD AUTO: 0 K/UL
NRBC BLD AUTO-RTO: 0 /100 WBC
PH UR STRIP.AUTO: 6 [PH]
PH UR STRIP.AUTO: 7.5 [PH]
PLATELET # BLD AUTO: 196 K/UL (ref 164–446)
PMV BLD AUTO: 9.3 FL (ref 9–12.9)
POTASSIUM SERPL-SCNC: 3.5 MMOL/L (ref 3.6–5.5)
PROT SERPL-MCNC: 8.3 G/DL (ref 6–8.2)
PROT UR QL STRIP: 100 MG/DL
PROT UR QL STRIP: NEGATIVE MG/DL
PROTHROMBIN TIME: 14.5 SEC (ref 12–14.6)
RBC # BLD AUTO: 5.03 M/UL (ref 4.7–6.1)
RBC # URNS HPF: >150 /HPF
RBC # URNS HPF: ABNORMAL /HPF
RBC UR QL AUTO: ABNORMAL
RBC UR QL AUTO: ABNORMAL
SODIUM SERPL-SCNC: 134 MMOL/L (ref 135–145)
SP GR UR STRIP.AUTO: 1.01
SP GR UR STRIP.AUTO: 1.01
UROBILINOGEN UR STRIP.AUTO-MCNC: 0.2 MG/DL
UROBILINOGEN UR STRIP.AUTO-MCNC: 0.2 MG/DL
WBC # BLD AUTO: 18.1 K/UL (ref 4.8–10.8)
WBC #/AREA URNS HPF: ABNORMAL /HPF
WBC #/AREA URNS HPF: ABNORMAL /HPF

## 2017-11-24 PROCEDURE — 700105 HCHG RX REV CODE 258: Performed by: INTERNAL MEDICINE

## 2017-11-24 PROCEDURE — 87077 CULTURE AEROBIC IDENTIFY: CPT | Mod: 91

## 2017-11-24 PROCEDURE — 96361 HYDRATE IV INFUSION ADD-ON: CPT

## 2017-11-24 PROCEDURE — 96375 TX/PRO/DX INJ NEW DRUG ADDON: CPT

## 2017-11-24 PROCEDURE — 83605 ASSAY OF LACTIC ACID: CPT | Mod: 91

## 2017-11-24 PROCEDURE — 700102 HCHG RX REV CODE 250 W/ 637 OVERRIDE(OP): Performed by: INTERNAL MEDICINE

## 2017-11-24 PROCEDURE — 99285 EMERGENCY DEPT VISIT HI MDM: CPT

## 2017-11-24 PROCEDURE — 700105 HCHG RX REV CODE 258: Performed by: EMERGENCY MEDICINE

## 2017-11-24 PROCEDURE — 94760 N-INVAS EAR/PLS OXIMETRY 1: CPT

## 2017-11-24 PROCEDURE — 81001 URINALYSIS AUTO W/SCOPE: CPT

## 2017-11-24 PROCEDURE — 96374 THER/PROPH/DIAG INJ IV PUSH: CPT

## 2017-11-24 PROCEDURE — 700111 HCHG RX REV CODE 636 W/ 250 OVERRIDE (IP): Performed by: EMERGENCY MEDICINE

## 2017-11-24 PROCEDURE — 700111 HCHG RX REV CODE 636 W/ 250 OVERRIDE (IP): Performed by: INTERNAL MEDICINE

## 2017-11-24 PROCEDURE — 81001 URINALYSIS AUTO W/SCOPE: CPT | Mod: 91

## 2017-11-24 PROCEDURE — 85730 THROMBOPLASTIN TIME PARTIAL: CPT

## 2017-11-24 PROCEDURE — A9270 NON-COVERED ITEM OR SERVICE: HCPCS | Performed by: INTERNAL MEDICINE

## 2017-11-24 PROCEDURE — 700102 HCHG RX REV CODE 250 W/ 637 OVERRIDE(OP): Performed by: EMERGENCY MEDICINE

## 2017-11-24 PROCEDURE — 87186 SC STD MICRODIL/AGAR DIL: CPT

## 2017-11-24 PROCEDURE — 87186 SC STD MICRODIL/AGAR DIL: CPT | Mod: 91

## 2017-11-24 PROCEDURE — 36415 COLL VENOUS BLD VENIPUNCTURE: CPT

## 2017-11-24 PROCEDURE — 99284 EMERGENCY DEPT VISIT MOD MDM: CPT

## 2017-11-24 PROCEDURE — A9270 NON-COVERED ITEM OR SERVICE: HCPCS | Performed by: EMERGENCY MEDICINE

## 2017-11-24 PROCEDURE — 85610 PROTHROMBIN TIME: CPT

## 2017-11-24 PROCEDURE — 87086 URINE CULTURE/COLONY COUNT: CPT | Mod: 91

## 2017-11-24 PROCEDURE — 85025 COMPLETE CBC W/AUTO DIFF WBC: CPT

## 2017-11-24 PROCEDURE — 770020 HCHG ROOM/CARE - TELE (206)

## 2017-11-24 PROCEDURE — 87040 BLOOD CULTURE FOR BACTERIA: CPT

## 2017-11-24 PROCEDURE — 87086 URINE CULTURE/COLONY COUNT: CPT

## 2017-11-24 PROCEDURE — 71010 DX-CHEST-PORTABLE (1 VIEW): CPT

## 2017-11-24 PROCEDURE — 99221 1ST HOSP IP/OBS SF/LOW 40: CPT | Performed by: INTERNAL MEDICINE

## 2017-11-24 PROCEDURE — 80053 COMPREHEN METABOLIC PANEL: CPT

## 2017-11-24 PROCEDURE — 87077 CULTURE AEROBIC IDENTIFY: CPT

## 2017-11-24 RX ORDER — CEPHALEXIN 500 MG/1
500 CAPSULE ORAL 4 TIMES DAILY
Qty: 28 CAP | Refills: 0 | Status: ON HOLD | OUTPATIENT
Start: 2017-11-24 | End: 2017-11-27

## 2017-11-24 RX ORDER — BISACODYL 10 MG
10 SUPPOSITORY, RECTAL RECTAL
Status: DISCONTINUED | OUTPATIENT
Start: 2017-11-24 | End: 2017-11-27 | Stop reason: HOSPADM

## 2017-11-24 RX ORDER — SODIUM CHLORIDE 9 MG/ML
500 INJECTION, SOLUTION INTRAVENOUS
Status: DISCONTINUED | OUTPATIENT
Start: 2017-11-24 | End: 2017-11-27 | Stop reason: HOSPADM

## 2017-11-24 RX ORDER — SODIUM CHLORIDE 9 MG/ML
30 INJECTION, SOLUTION INTRAVENOUS
Status: DISCONTINUED | OUTPATIENT
Start: 2017-11-24 | End: 2017-11-24

## 2017-11-24 RX ORDER — HYDROMORPHONE HYDROCHLORIDE 2 MG/ML
1 INJECTION, SOLUTION INTRAMUSCULAR; INTRAVENOUS; SUBCUTANEOUS ONCE
Status: COMPLETED | OUTPATIENT
Start: 2017-11-24 | End: 2017-11-24

## 2017-11-24 RX ORDER — AMOXICILLIN 250 MG
2 CAPSULE ORAL 2 TIMES DAILY
Status: DISCONTINUED | OUTPATIENT
Start: 2017-11-24 | End: 2017-11-27 | Stop reason: HOSPADM

## 2017-11-24 RX ORDER — CEFTRIAXONE 2 G/1
2 INJECTION, POWDER, FOR SOLUTION INTRAMUSCULAR; INTRAVENOUS ONCE
Status: COMPLETED | OUTPATIENT
Start: 2017-11-24 | End: 2017-11-24

## 2017-11-24 RX ORDER — MORPHINE SULFATE 4 MG/ML
1 INJECTION, SOLUTION INTRAMUSCULAR; INTRAVENOUS EVERY 4 HOURS PRN
Status: DISCONTINUED | OUTPATIENT
Start: 2017-11-24 | End: 2017-11-25

## 2017-11-24 RX ORDER — SODIUM CHLORIDE 9 MG/ML
INJECTION, SOLUTION INTRAVENOUS CONTINUOUS
Status: DISPENSED | OUTPATIENT
Start: 2017-11-24 | End: 2017-11-25

## 2017-11-24 RX ORDER — CEPHALEXIN 500 MG/1
500 CAPSULE ORAL ONCE
Status: COMPLETED | OUTPATIENT
Start: 2017-11-24 | End: 2017-11-24

## 2017-11-24 RX ORDER — POLYETHYLENE GLYCOL 3350 17 G/17G
1 POWDER, FOR SOLUTION ORAL
Status: DISCONTINUED | OUTPATIENT
Start: 2017-11-24 | End: 2017-11-27 | Stop reason: HOSPADM

## 2017-11-24 RX ORDER — SODIUM CHLORIDE 9 MG/ML
30 INJECTION, SOLUTION INTRAVENOUS
Status: COMPLETED | OUTPATIENT
Start: 2017-11-24 | End: 2017-11-24

## 2017-11-24 RX ORDER — ACETAMINOPHEN 325 MG/1
650 TABLET ORAL ONCE
Status: COMPLETED | OUTPATIENT
Start: 2017-11-24 | End: 2017-11-24

## 2017-11-24 RX ORDER — ACETAMINOPHEN 500 MG
500 TABLET ORAL ONCE
Status: COMPLETED | OUTPATIENT
Start: 2017-11-24 | End: 2017-11-24

## 2017-11-24 RX ADMIN — STANDARDIZED SENNA CONCENTRATE AND DOCUSATE SODIUM 2 TABLET: 8.6; 5 TABLET, FILM COATED ORAL at 20:35

## 2017-11-24 RX ADMIN — ACETAMINOPHEN 500 MG: 500 TABLET ORAL at 17:39

## 2017-11-24 RX ADMIN — SODIUM CHLORIDE: 9 INJECTION, SOLUTION INTRAVENOUS at 17:39

## 2017-11-24 RX ADMIN — SODIUM CHLORIDE 2640 ML: 9 INJECTION, SOLUTION INTRAVENOUS at 14:23

## 2017-11-24 RX ADMIN — CEPHALEXIN 500 MG: 500 CAPSULE ORAL at 08:00

## 2017-11-24 RX ADMIN — CEFTRIAXONE SODIUM 2 G: 2 INJECTION, POWDER, FOR SOLUTION INTRAMUSCULAR; INTRAVENOUS at 14:58

## 2017-11-24 RX ADMIN — MORPHINE SULFATE 1 MG: 4 INJECTION INTRAVENOUS at 16:23

## 2017-11-24 RX ADMIN — HYDROMORPHONE HYDROCHLORIDE 1 MG: 2 INJECTION INTRAMUSCULAR; INTRAVENOUS; SUBCUTANEOUS at 14:22

## 2017-11-24 RX ADMIN — MORPHINE SULFATE 1 MG: 4 INJECTION INTRAVENOUS at 20:36

## 2017-11-24 RX ADMIN — ACETAMINOPHEN 650 MG: 325 TABLET, FILM COATED ORAL at 16:36

## 2017-11-24 ASSESSMENT — LIFESTYLE VARIABLES
DO YOU DRINK ALCOHOL: NO
EVER FELT BAD OR GUILTY ABOUT YOUR DRINKING: NO
TOTAL SCORE: 0
TOTAL SCORE: 0
AVERAGE NUMBER OF DAYS PER WEEK YOU HAVE A DRINK CONTAINING ALCOHOL: 2
HOW MANY TIMES IN THE PAST YEAR HAVE YOU HAD 5 OR MORE DRINKS IN A DAY: 0
EVER HAD A DRINK FIRST THING IN THE MORNING TO STEADY YOUR NERVES TO GET RID OF A HANGOVER: NO
ALCOHOL_USE: YES
ON A TYPICAL DAY WHEN YOU DRINK ALCOHOL HOW MANY DRINKS DO YOU HAVE: 2
EVER_SMOKED: NEVER
HAVE PEOPLE ANNOYED YOU BY CRITICIZING YOUR DRINKING: NO
CONSUMPTION TOTAL: NEGATIVE
EVER_SMOKED: YES
HAVE YOU EVER FELT YOU SHOULD CUT DOWN ON YOUR DRINKING: NO
TOTAL SCORE: 0

## 2017-11-24 ASSESSMENT — PAIN SCALES - GENERAL
PAINLEVEL_OUTOF10: 9
PAINLEVEL_OUTOF10: 7
PAINLEVEL_OUTOF10: 0

## 2017-11-24 ASSESSMENT — COGNITIVE AND FUNCTIONAL STATUS - GENERAL
SUGGESTED CMS G CODE MODIFIER DAILY ACTIVITY: CH
SUGGESTED CMS G CODE MODIFIER MOBILITY: CH
DAILY ACTIVITIY SCORE: 24
MOBILITY SCORE: 24

## 2017-11-24 ASSESSMENT — COPD QUESTIONNAIRES
DO YOU EVER COUGH UP ANY MUCUS OR PHLEGM?: NO/ONLY WITH OCCASIONAL COLDS OR INFECTIONS
COPD SCREENING SCORE: 2
DURING THE PAST 4 WEEKS HOW MUCH DID YOU FEEL SHORT OF BREATH: NONE/LITTLE OF THE TIME
DURING THE PAST 4 WEEKS HOW MUCH DID YOU FEEL SHORT OF BREATH: NONE/LITTLE OF THE TIME
COPD SCREENING SCORE: 0
DO YOU EVER COUGH UP ANY MUCUS OR PHLEGM?: NO/ONLY WITH OCCASIONAL COLDS OR INFECTIONS
HAVE YOU SMOKED AT LEAST 100 CIGARETTES IN YOUR ENTIRE LIFE: NO/DON'T KNOW
HAVE YOU SMOKED AT LEAST 100 CIGARETTES IN YOUR ENTIRE LIFE: YES

## 2017-11-24 ASSESSMENT — ENCOUNTER SYMPTOMS
CHILLS: 1
MYALGIAS: 0
ABDOMINAL PAIN: 1
FEVER: 1
DEPRESSION: 0
BLURRED VISION: 0
LOSS OF CONSCIOUSNESS: 0
VOMITING: 0
HEMOPTYSIS: 0
WEIGHT LOSS: 0
NECK PAIN: 0
NAUSEA: 0
FOCAL WEAKNESS: 0
FLANK PAIN: 1
DIZZINESS: 0

## 2017-11-24 ASSESSMENT — PATIENT HEALTH QUESTIONNAIRE - PHQ9
2. FEELING DOWN, DEPRESSED, IRRITABLE, OR HOPELESS: NOT AT ALL
1. LITTLE INTEREST OR PLEASURE IN DOING THINGS: NOT AT ALL
SUM OF ALL RESPONSES TO PHQ9 QUESTIONS 1 AND 2: 0
SUM OF ALL RESPONSES TO PHQ QUESTIONS 1-9: 0

## 2017-11-24 NOTE — DISCHARGE INSTRUCTIONS
"Preguntas frecuentes sobre las infecciones urinarias asociadas al uso de la sonda  (Catheter-Associated Urinary Tract Infection FAQs)  ¿Qué es cherrie \"infección urinaria asociada al uso de la sonda\"?  Cherrie infección urinaria (también llamada \"UTI\") es cherrie infección del aparato urinario, el cual incluye la vejiga (que almacena la orina) y los riñones (que filtran la gio para fabricar la orina). Normalmente, los microbios (por ejemplo, bacterias u hongos) no viven en estas zonas; sin embargo, si estos penetran, se puede producir cherrie infección.  Si tiene cherrie sonda urinaria, los microbios pueden desplazarse a lo beth de esta y provocarle cherrie infección en la vejiga o el riñón. En lillian travis, se la conoce jordan infección urinaria asociada al uso de la sonda (CA-UTI).   ¿Qué es cherrie sonda urinaria?  Cherrie sonda urinaria es un tubo bruno que se coloca en la vejiga para eliminar la orina. La orina se elimina a través del tubo dentro de cherrie bolsa de recolección. Se puede usar cherrie sonda urinaria:  · Si no puede orinar por sí solo.  · Para medir la cantidad de orina que produce, por ejemplo, mientras está en cuidados intensivos.  · Ignacia y después de algunos tipos de cirugías.  · Ignacia algunos estudios de los riñones y la vejiga.  Las personas con cherrie sonda urinaria enfrentan cherrie probabilidad más elevada de contraer cherrie infección urinaria en comparación con aquellas que no tienen un dispositivo de jordan tipo.  ¿Cómo contraigo cherrie infección urinaria asociada al uso de la sonda (CA-UTI)?  Si los microbios ingresan a las vías urinarias, pueden causar cherrie infección. Muchos de los microbios que causan cherrie infección urinaria asociada al uso de la sonda se encuentran comúnmente en los intestinos, donde no suelen provocar infecciones. Los microbios pueden ingresar a las vías urinarias cuando se está colocando la sonda o mientras la sonda está puesta en la vejiga.   ¿Cuáles son los síntomas de cherrie infección urinaria?  Algunos de los " síntomas más frecuentes de infección urinaria son los siguientes:  · Ardor o dolor en la parte baja del abdomen (es decir, debajo del estómago).  · Fiebre  · La gio en la orina puede ser un signo de infección, kamilla también se debe a otros problemas.  · Ardor al orinar o aumento de la frecuencia de las micciones después de que se retira la sonda.  A veces, las personas con infecciones urinarias asociadas al uso de la sonda no tienen estos síntomas de infección.  ¿Pueden tratarse las infecciones urinarias asociadas al uso de la sonda?  Sí. La mayoría de las infecciones urinarias asociadas al uso de la sonda pueden tratarse con antibióticos y la extracción o el cambio de la sonda. El médico determinará el antibiótico más adecuado para benito travis.   ¿Qué medidas se toshia en los hospitales para prevenir las infecciones urinarias asociadas al uso de la sonda?  Para prevenir las infecciones urinarias, los médicos y los enfermeros toshia las siguientes medidas.   Colocación de la sonda  · Sondas externas en los hombres (parecen preservativos y se colocan sobre el pene y no dentro de joradn).  · La colocación temporal de cherrie sonda para la eliminación de orina y benito retiro inmediato. [place] se conoce jordan sondaje uretral intermitente.  Cuidado de la sonda  ¿Qué puedo hacer para prevenir las infecciones urinarias asociadas al uso de la sonda si tengo cherrie colocada?  · Lávese siempre las benji antes y después del cuidado de la sonda.  · Mantenga siempre la bolsa de orina por debajo de la altura de la vejiga.  · No tironee ni jale del tubo.  · No retuerza ni enrosque el tubo de la sonda.  · Pregúntele diariamente al médico si aún necesita la sonda.  ¿Qué tengo que hacer cuando salga del hospital y vuelva a casa?  · Si usted vuelve a benito casa con cherrie sonda colocada, el médico o la enfermera deben explicarle todo lo que necesita saber sobre el cuidado de la sonda. Asegúrese de que comprende cómo cuidar la sonda antes de salir del  hospital.  · Si tiene alguno de los síntomas de cherrie infección urinaria, jordan ardor o dolor en la parte baja del abdomen, fiebre o el aumento de la frecuencia de las micciones, comuníquese de inmediato con el médico o el enfermero.  · Antes de retirarse, asegúrese de saber con quién comunicarse si tiene dudas o surgen problemas cuando se encuentre en benito casa.  Si tiene alguna mare, consulte a benito médico o enfermero.  Desarrollado y patrocinado en conjunto por la Sociedad Americana de Epidemiología Hospitalaria (The Society for Healthcare Epidemiology of [place], SHEA); la Sociedad Estadounidense de Enfermedades Infecciosas (Infectious Diseases Society of [place], IDSA); la Asociación Americana de Hospitales (American Hospital Association); la Asociación de Profesionales de Control de Infecciones y Epidemiología (Association for Professionals in Infection Control and Epidemiology, APIC); el Centro para el Control y la Prevención de Enfermedades (Center for Disease Control and Prevention, CDC); y la Comisión Conjunta (The Joint Commission).     Esta información no tiene jordan fin reemplazar el consejo del médico. Asegúrese de hacerle al médico cualquier pregunta que tenga.     Document Released: 09/11/2013 Document Revised: 05/03/2016  Elsevier Interactive Patient Education ©2016 [employer]

## 2017-11-24 NOTE — ED NOTES
Accessed and flushed suprapubic catheter with 35mL sterile water. Pulled 45mL  Pale yellow urine out. Attached to leg bag and still draining. Pt states he has immediate abd pain and discomfort relief. Urine sent to lab
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
## 2017-11-24 NOTE — ED NOTES
Discharge instructions given to Pt and significant other. Pt ambulated out of the ER with no assistance needed.

## 2017-11-24 NOTE — ED NOTES
Med rec complete per pt and family at bedside to translate  Allergies reviewed - NKDA  Pt finished a 7 day course of Augmentin on 11/17/17  Pt was prescribed Keflex earlier today. Has taken one dose so far

## 2017-11-24 NOTE — ED NOTES
Wheeled to triage  Chief Complaint   Patient presents with   • Painful Urination     was seen here earlier today for same, has a catheter in place     Pt has a sepsis score of 3, charge RN notified, pt to rm 10.
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
## 2017-11-25 ENCOUNTER — PATIENT OUTREACH (OUTPATIENT)
Dept: HEALTH INFORMATION MANAGEMENT | Facility: OTHER | Age: 27
End: 2017-11-25

## 2017-11-25 LAB
ANION GAP SERPL CALC-SCNC: 8 MMOL/L (ref 0–11.9)
BASOPHILS # BLD AUTO: 0.2 % (ref 0–1.8)
BASOPHILS # BLD: 0.04 K/UL (ref 0–0.12)
BUN SERPL-MCNC: 6 MG/DL (ref 8–22)
CALCIUM SERPL-MCNC: 8.9 MG/DL (ref 8.5–10.5)
CHLORIDE SERPL-SCNC: 107 MMOL/L (ref 96–112)
CO2 SERPL-SCNC: 21 MMOL/L (ref 20–33)
CREAT SERPL-MCNC: 0.58 MG/DL (ref 0.5–1.4)
EOSINOPHIL # BLD AUTO: 0.12 K/UL (ref 0–0.51)
EOSINOPHIL NFR BLD: 0.7 % (ref 0–6.9)
ERYTHROCYTE [DISTWIDTH] IN BLOOD BY AUTOMATED COUNT: 41.1 FL (ref 35.9–50)
GFR SERPL CREATININE-BSD FRML MDRD: >60 ML/MIN/1.73 M 2
GLUCOSE SERPL-MCNC: 107 MG/DL (ref 65–99)
HCT VFR BLD AUTO: 39.2 % (ref 42–52)
HGB BLD-MCNC: 13.4 G/DL (ref 14–18)
IMM GRANULOCYTES # BLD AUTO: 0.14 K/UL (ref 0–0.11)
IMM GRANULOCYTES NFR BLD AUTO: 0.8 % (ref 0–0.9)
LYMPHOCYTES # BLD AUTO: 1.52 K/UL (ref 1–4.8)
LYMPHOCYTES NFR BLD: 8.4 % (ref 22–41)
MCH RBC QN AUTO: 30.8 PG (ref 27–33)
MCHC RBC AUTO-ENTMCNC: 34.2 G/DL (ref 33.7–35.3)
MCV RBC AUTO: 90.1 FL (ref 81.4–97.8)
MONOCYTES # BLD AUTO: 1.62 K/UL (ref 0–0.85)
MONOCYTES NFR BLD AUTO: 9 % (ref 0–13.4)
NEUTROPHILS # BLD AUTO: 14.61 K/UL (ref 1.82–7.42)
NEUTROPHILS NFR BLD: 80.9 % (ref 44–72)
NRBC # BLD AUTO: 0 K/UL
NRBC BLD AUTO-RTO: 0 /100 WBC
PLATELET # BLD AUTO: 167 K/UL (ref 164–446)
PMV BLD AUTO: 9.6 FL (ref 9–12.9)
POTASSIUM SERPL-SCNC: 3.4 MMOL/L (ref 3.6–5.5)
RBC # BLD AUTO: 4.35 M/UL (ref 4.7–6.1)
SODIUM SERPL-SCNC: 136 MMOL/L (ref 135–145)
WBC # BLD AUTO: 18.1 K/UL (ref 4.8–10.8)

## 2017-11-25 PROCEDURE — 99232 SBSQ HOSP IP/OBS MODERATE 35: CPT | Performed by: HOSPITALIST

## 2017-11-25 PROCEDURE — 770020 HCHG ROOM/CARE - TELE (206)

## 2017-11-25 PROCEDURE — 700111 HCHG RX REV CODE 636 W/ 250 OVERRIDE (IP): Performed by: INTERNAL MEDICINE

## 2017-11-25 PROCEDURE — 700105 HCHG RX REV CODE 258: Performed by: HOSPITALIST

## 2017-11-25 PROCEDURE — A9270 NON-COVERED ITEM OR SERVICE: HCPCS | Performed by: INTERNAL MEDICINE

## 2017-11-25 PROCEDURE — 80048 BASIC METABOLIC PNL TOTAL CA: CPT

## 2017-11-25 PROCEDURE — 36415 COLL VENOUS BLD VENIPUNCTURE: CPT

## 2017-11-25 PROCEDURE — 700102 HCHG RX REV CODE 250 W/ 637 OVERRIDE(OP): Performed by: HOSPITALIST

## 2017-11-25 PROCEDURE — 700102 HCHG RX REV CODE 250 W/ 637 OVERRIDE(OP): Performed by: INTERNAL MEDICINE

## 2017-11-25 PROCEDURE — 85025 COMPLETE CBC W/AUTO DIFF WBC: CPT

## 2017-11-25 PROCEDURE — A9270 NON-COVERED ITEM OR SERVICE: HCPCS | Performed by: HOSPITALIST

## 2017-11-25 PROCEDURE — 700105 HCHG RX REV CODE 258: Performed by: INTERNAL MEDICINE

## 2017-11-25 RX ORDER — TRAMADOL HYDROCHLORIDE 50 MG/1
50 TABLET ORAL EVERY 6 HOURS PRN
Status: DISCONTINUED | OUTPATIENT
Start: 2017-11-25 | End: 2017-11-27 | Stop reason: HOSPADM

## 2017-11-25 RX ORDER — HYDROMORPHONE HYDROCHLORIDE 2 MG/ML
0.5 INJECTION, SOLUTION INTRAMUSCULAR; INTRAVENOUS; SUBCUTANEOUS
Status: DISCONTINUED | OUTPATIENT
Start: 2017-11-25 | End: 2017-11-27 | Stop reason: HOSPADM

## 2017-11-25 RX ORDER — ACETAMINOPHEN 325 MG/1
650 TABLET ORAL EVERY 6 HOURS PRN
Status: DISCONTINUED | OUTPATIENT
Start: 2017-11-25 | End: 2017-11-27 | Stop reason: HOSPADM

## 2017-11-25 RX ORDER — POTASSIUM CHLORIDE 20 MEQ/1
40 TABLET, EXTENDED RELEASE ORAL ONCE
Status: COMPLETED | OUTPATIENT
Start: 2017-11-25 | End: 2017-11-25

## 2017-11-25 RX ADMIN — SODIUM CHLORIDE: 9 INJECTION, SOLUTION INTRAVENOUS at 06:38

## 2017-11-25 RX ADMIN — CEFTRIAXONE 2 G: 2 INJECTION, POWDER, FOR SOLUTION INTRAMUSCULAR; INTRAVENOUS at 08:31

## 2017-11-25 RX ADMIN — STANDARDIZED SENNA CONCENTRATE AND DOCUSATE SODIUM 2 TABLET: 8.6; 5 TABLET, FILM COATED ORAL at 08:31

## 2017-11-25 RX ADMIN — POTASSIUM CHLORIDE 40 MEQ: 1500 TABLET, EXTENDED RELEASE ORAL at 11:16

## 2017-11-25 RX ADMIN — TRAMADOL HYDROCHLORIDE 50 MG: 50 TABLET, COATED ORAL at 18:16

## 2017-11-25 RX ADMIN — SODIUM CHLORIDE: 9 INJECTION, SOLUTION INTRAVENOUS at 00:17

## 2017-11-25 RX ADMIN — SODIUM CHLORIDE: 9 INJECTION, SOLUTION INTRAVENOUS at 15:22

## 2017-11-25 RX ADMIN — STANDARDIZED SENNA CONCENTRATE AND DOCUSATE SODIUM 2 TABLET: 8.6; 5 TABLET, FILM COATED ORAL at 20:20

## 2017-11-25 RX ADMIN — MORPHINE SULFATE 1 MG: 4 INJECTION INTRAVENOUS at 06:38

## 2017-11-25 RX ADMIN — TRAMADOL HYDROCHLORIDE 50 MG: 50 TABLET, COATED ORAL at 11:16

## 2017-11-25 ASSESSMENT — PAIN SCALES - GENERAL
PAINLEVEL_OUTOF10: 4
PAINLEVEL_OUTOF10: 0
PAINLEVEL_OUTOF10: 3
PAINLEVEL_OUTOF10: 3
PAINLEVEL_OUTOF10: 5
PAINLEVEL_OUTOF10: 6
PAINLEVEL_OUTOF10: 0
PAINLEVEL_OUTOF10: 4
PAINLEVEL_OUTOF10: 7

## 2017-11-25 ASSESSMENT — COPD QUESTIONNAIRES
DO YOU EVER COUGH UP ANY MUCUS OR PHLEGM?: NO/ONLY WITH OCCASIONAL COLDS OR INFECTIONS
COPD SCREENING SCORE: 2
HAVE YOU SMOKED AT LEAST 100 CIGARETTES IN YOUR ENTIRE LIFE: YES
DURING THE PAST 4 WEEKS HOW MUCH DID YOU FEEL SHORT OF BREATH: NONE/LITTLE OF THE TIME

## 2017-11-25 ASSESSMENT — PATIENT HEALTH QUESTIONNAIRE - PHQ9
SUM OF ALL RESPONSES TO PHQ9 QUESTIONS 1 AND 2: 0
1. LITTLE INTEREST OR PLEASURE IN DOING THINGS: NOT AT ALL
2. FEELING DOWN, DEPRESSED, IRRITABLE, OR HOPELESS: NOT AT ALL
SUM OF ALL RESPONSES TO PHQ QUESTIONS 1-9: 0

## 2017-11-25 ASSESSMENT — LIFESTYLE VARIABLES
CONSUMPTION TOTAL: NEGATIVE
ON A TYPICAL DAY WHEN YOU DRINK ALCOHOL HOW MANY DRINKS DO YOU HAVE: 2
TOTAL SCORE: 0
EVER HAD A DRINK FIRST THING IN THE MORNING TO STEADY YOUR NERVES TO GET RID OF A HANGOVER: NO
TOTAL SCORE: 0
TOTAL SCORE: 0
AVERAGE NUMBER OF DAYS PER WEEK YOU HAVE A DRINK CONTAINING ALCOHOL: 2
DO YOU DRINK ALCOHOL: YES
HAVE YOU EVER FELT YOU SHOULD CUT DOWN ON YOUR DRINKING: NO
HOW MANY TIMES IN THE PAST YEAR HAVE YOU HAD 5 OR MORE DRINKS IN A DAY: 0
EVER FELT BAD OR GUILTY ABOUT YOUR DRINKING: NO
HAVE PEOPLE ANNOYED YOU BY CRITICIZING YOUR DRINKING: NO

## 2017-11-25 ASSESSMENT — ENCOUNTER SYMPTOMS
MUSCULOSKELETAL NEGATIVE: 1
NEUROLOGICAL NEGATIVE: 1
EYES NEGATIVE: 1
CARDIOVASCULAR NEGATIVE: 1
RESPIRATORY NEGATIVE: 1
ABDOMINAL PAIN: 1

## 2017-11-25 NOTE — PROGRESS NOTES
Received report from vandana RN. Assumed care of patient. Patient is ST on the monitor at this time. Patient is resting in bed with no family present at this time. Plan of care discussed with patient. Patient declines any needs at this time. Bed locked and in the lowest position with call light within reach.

## 2017-11-25 NOTE — CARE PLAN
Problem: Safety  Goal: Will remain free from injury  Outcome: PROGRESSING AS EXPECTED  Discussed with the patient the importance of calling for assistance prior to getting out of bed due to his weakness and the pain medications that he is on, in order to help prevent falls. Patient accepting of the information. All questions answered at this time.     Problem: Infection  Goal: Will remain free from infection  Outcome: PROGRESSING AS EXPECTED  Discussed with the patient the importance of washing his hands before and after eating or using the restroom in order to help prevent infections. Patient accepting of the information. All questions answered at this time.
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
## 2017-11-25 NOTE — PROGRESS NOTES
Bedside report received from night shift RN. Assumed care. Pt is A&Ox4. Pt is in bed. Pt was updated on the plan of care for the day. All questions answered. Pt has call light within reach and bed is in lowest position. All fall precautions in place. Pt has non-slip socks on, and appropriate signs are on the door. Pt has no other needs at this time.

## 2017-11-25 NOTE — PROGRESS NOTES
[facility] Hospitalist Progress Note    Date of Service: 2017    Chief Complaint  27 y.o. male admitted 2017 with dysuria and fever    Interval Problem Update  Suprapubic catheter changed in er    He is c/o pelvic pain, intensity 4/10, dull ache, no radiation, constant    Pain goes as high as 9/10    Low potasium 3.4    Consultants/Specialty  None    Disposition  home        Review of Systems   Constitutional: Positive for malaise/fatigue.   HENT: Negative.    Eyes: Negative.    Respiratory: Negative.    Cardiovascular: Negative.    Gastrointestinal: Positive for abdominal pain (lower pelvic area).   Genitourinary: Positive for dysuria.   Musculoskeletal: Negative.    Skin: Negative.    Neurological: Negative.    All other systems reviewed and are negative.     Physical Exam  Laboratory/Imaging   Hemodynamics  Temp (24hrs), Av.2 °C (100.7 °F), Min:37 °C (98.6 °F), Max:39.4 °C (102.9 °F)   Temperature: 37.4 °C (99.4 °F)  Pulse  Av.3  Min: 76  Max: 127 Heart Rate (Monitored): (!) 106  Blood Pressure: 100/66, NIBP: 106/61      Respiratory      Respiration: 15, Pulse Oximetry: 93 %, O2 Daily Delivery Respiratory : Room Air with O2 Available        RUL Breath Sounds: Clear, RML Breath Sounds: Clear, RLL Breath Sounds: Diminished, JOSE Breath Sounds: Clear, LLL Breath Sounds: Diminished    Fluids    Intake/Output Summary (Last 24 hours) at 17 0945  Last data filed at 17 0850   Gross per 24 hour   Intake              240 ml   Output             3620 ml   Net            -3380 ml       Nutrition  Orders Placed This Encounter   Procedures   • Diet Order     Standing Status:   Standing     Number of Occurrences:   1     Order Specific Question:   Diet:     Answer:   Regular [1]     Physical Exam   Constitutional: He is oriented to person, place, and time. No distress.   Eyes: Left eye exhibits no discharge.   Neck: No tracheal deviation present. No thyromegaly present.   Cardiovascular: Normal rate.   Exam reveals no gallop and no friction rub.    No murmur heard.  Pulmonary/Chest: Breath sounds normal. No respiratory distress. He has no wheezes.   Abdominal: Bowel sounds are normal. There is tenderness (pelvic area. no mass).   Musculoskeletal: He exhibits no edema or tenderness.   Neurological: He is alert and oriented to person, place, and time. No cranial nerve deficit. Coordination normal.   Skin: He is not diaphoretic.   Nursing note and vitals reviewed.      Recent Labs      11/24/17   1405  11/25/17   0222   WBC  18.1*  18.1*   RBC  5.03  4.35*   HEMOGLOBIN  15.2  13.4*   HEMATOCRIT  43.5  39.2*   MCV  86.5  90.1   MCH  30.2  30.8   MCHC  34.9  34.2   RDW  39.5  41.1   PLATELETCT  196  167   MPV  9.3  9.6     Recent Labs      11/24/17   1405  11/25/17   0222   SODIUM  134*  136   POTASSIUM  3.5*  3.4*   CHLORIDE  103  107   CO2  21  21   GLUCOSE  104*  107*   BUN  11  6*   CREATININE  0.65  0.58   CALCIUM  9.8  8.9     Recent Labs      11/24/17   1540   APTT  31.8   INR  1.16*                  Assessment/Plan     * Complicated UTI (urinary tract infection)- (present on admission)   Assessment & Plan    -Secondary to suprapubic catheter  -IV ceftriaxone for empiric coverage follow-up urine cultures          Suprapubic catheter (CMS-HCC)- (present on admission)   Assessment & Plan    -Already exchange in the ER        Sepsis (CMS-[facility])- (present on admission)   Assessment & Plan    -This is sepsis (without associated acute organ dysfunction).   -See above        Swelling, scrotum- (present on admission)   Assessment & Plan    Will improve with abx treatment          check am cbc, bmp    Harris catheter::  Neurogenic Bladder  DVT prophylaxis - mechanical:  SCDs

## 2017-11-26 LAB
ANION GAP SERPL CALC-SCNC: 10 MMOL/L (ref 0–11.9)
BACTERIA UR CULT: ABNORMAL
BASOPHILS # BLD AUTO: 0.5 % (ref 0–1.8)
BASOPHILS # BLD: 0.05 K/UL (ref 0–0.12)
BUN SERPL-MCNC: 6 MG/DL (ref 8–22)
CALCIUM SERPL-MCNC: 9.3 MG/DL (ref 8.5–10.5)
CHLORIDE SERPL-SCNC: 101 MMOL/L (ref 96–112)
CO2 SERPL-SCNC: 24 MMOL/L (ref 20–33)
CREAT SERPL-MCNC: 0.72 MG/DL (ref 0.5–1.4)
EOSINOPHIL # BLD AUTO: 0.23 K/UL (ref 0–0.51)
EOSINOPHIL NFR BLD: 2.1 % (ref 0–6.9)
ERYTHROCYTE [DISTWIDTH] IN BLOOD BY AUTOMATED COUNT: 41.6 FL (ref 35.9–50)
GFR SERPL CREATININE-BSD FRML MDRD: >60 ML/MIN/1.73 M 2
GLUCOSE SERPL-MCNC: 93 MG/DL (ref 65–99)
HCT VFR BLD AUTO: 39.7 % (ref 42–52)
HGB BLD-MCNC: 13.5 G/DL (ref 14–18)
IMM GRANULOCYTES # BLD AUTO: 0.04 K/UL (ref 0–0.11)
IMM GRANULOCYTES NFR BLD AUTO: 0.4 % (ref 0–0.9)
LYMPHOCYTES # BLD AUTO: 1.19 K/UL (ref 1–4.8)
LYMPHOCYTES NFR BLD: 10.9 % (ref 22–41)
MCH RBC QN AUTO: 30.5 PG (ref 27–33)
MCHC RBC AUTO-ENTMCNC: 34 G/DL (ref 33.7–35.3)
MCV RBC AUTO: 89.8 FL (ref 81.4–97.8)
MONOCYTES # BLD AUTO: 0.93 K/UL (ref 0–0.85)
MONOCYTES NFR BLD AUTO: 8.5 % (ref 0–13.4)
NEUTROPHILS # BLD AUTO: 8.5 K/UL (ref 1.82–7.42)
NEUTROPHILS NFR BLD: 77.6 % (ref 44–72)
NRBC # BLD AUTO: 0 K/UL
NRBC BLD AUTO-RTO: 0 /100 WBC
PLATELET # BLD AUTO: 167 K/UL (ref 164–446)
PMV BLD AUTO: 9.6 FL (ref 9–12.9)
POTASSIUM SERPL-SCNC: 3.7 MMOL/L (ref 3.6–5.5)
RBC # BLD AUTO: 4.42 M/UL (ref 4.7–6.1)
SIGNIFICANT IND 70042: ABNORMAL
SIGNIFICANT IND 70042: ABNORMAL
SITE SITE: ABNORMAL
SITE SITE: ABNORMAL
SODIUM SERPL-SCNC: 135 MMOL/L (ref 135–145)
SOURCE SOURCE: ABNORMAL
SOURCE SOURCE: ABNORMAL
WBC # BLD AUTO: 10.9 K/UL (ref 4.8–10.8)

## 2017-11-26 PROCEDURE — 99232 SBSQ HOSP IP/OBS MODERATE 35: CPT | Performed by: HOSPITALIST

## 2017-11-26 PROCEDURE — 85025 COMPLETE CBC W/AUTO DIFF WBC: CPT

## 2017-11-26 PROCEDURE — 700111 HCHG RX REV CODE 636 W/ 250 OVERRIDE (IP): Performed by: HOSPITALIST

## 2017-11-26 PROCEDURE — 700102 HCHG RX REV CODE 250 W/ 637 OVERRIDE(OP): Performed by: HOSPITALIST

## 2017-11-26 PROCEDURE — 51798 US URINE CAPACITY MEASURE: CPT

## 2017-11-26 PROCEDURE — 80048 BASIC METABOLIC PNL TOTAL CA: CPT

## 2017-11-26 PROCEDURE — 700111 HCHG RX REV CODE 636 W/ 250 OVERRIDE (IP): Performed by: INTERNAL MEDICINE

## 2017-11-26 PROCEDURE — A9270 NON-COVERED ITEM OR SERVICE: HCPCS | Performed by: HOSPITALIST

## 2017-11-26 PROCEDURE — 770020 HCHG ROOM/CARE - TELE (206)

## 2017-11-26 PROCEDURE — A9270 NON-COVERED ITEM OR SERVICE: HCPCS | Performed by: INTERNAL MEDICINE

## 2017-11-26 PROCEDURE — 36415 COLL VENOUS BLD VENIPUNCTURE: CPT

## 2017-11-26 PROCEDURE — 700102 HCHG RX REV CODE 250 W/ 637 OVERRIDE(OP): Performed by: INTERNAL MEDICINE

## 2017-11-26 RX ORDER — CIPROFLOXACIN 500 MG/1
500 TABLET, FILM COATED ORAL EVERY 12 HOURS
Status: DISCONTINUED | OUTPATIENT
Start: 2017-11-26 | End: 2017-11-27 | Stop reason: HOSPADM

## 2017-11-26 RX ADMIN — TRAMADOL HYDROCHLORIDE 50 MG: 50 TABLET, COATED ORAL at 08:59

## 2017-11-26 RX ADMIN — CEFTRIAXONE 2 G: 2 INJECTION, POWDER, FOR SOLUTION INTRAMUSCULAR; INTRAVENOUS at 08:59

## 2017-11-26 RX ADMIN — TRAMADOL HYDROCHLORIDE 50 MG: 50 TABLET, COATED ORAL at 02:28

## 2017-11-26 RX ADMIN — ACETAMINOPHEN 650 MG: 325 TABLET, FILM COATED ORAL at 15:22

## 2017-11-26 RX ADMIN — ACETAMINOPHEN 650 MG: 325 TABLET, FILM COATED ORAL at 04:48

## 2017-11-26 RX ADMIN — HYDROMORPHONE HYDROCHLORIDE 0.5 MG: 2 INJECTION INTRAMUSCULAR; INTRAVENOUS; SUBCUTANEOUS at 12:35

## 2017-11-26 RX ADMIN — STANDARDIZED SENNA CONCENTRATE AND DOCUSATE SODIUM 2 TABLET: 8.6; 5 TABLET, FILM COATED ORAL at 08:59

## 2017-11-26 RX ADMIN — CIPROFLOXACIN HYDROCHLORIDE 500 MG: 500 TABLET, FILM COATED ORAL at 20:36

## 2017-11-26 RX ADMIN — TRAMADOL HYDROCHLORIDE 50 MG: 50 TABLET, COATED ORAL at 17:07

## 2017-11-26 RX ADMIN — CIPROFLOXACIN HYDROCHLORIDE 500 MG: 500 TABLET, FILM COATED ORAL at 12:35

## 2017-11-26 RX ADMIN — STANDARDIZED SENNA CONCENTRATE AND DOCUSATE SODIUM 2 TABLET: 8.6; 5 TABLET, FILM COATED ORAL at 20:36

## 2017-11-26 ASSESSMENT — LIFESTYLE VARIABLES
DO YOU DRINK ALCOHOL: YES
AVERAGE NUMBER OF DAYS PER WEEK YOU HAVE A DRINK CONTAINING ALCOHOL: 2
TOTAL SCORE: 0
HAVE YOU EVER FELT YOU SHOULD CUT DOWN ON YOUR DRINKING: NO
HAVE PEOPLE ANNOYED YOU BY CRITICIZING YOUR DRINKING: NO
TOTAL SCORE: 0
EVER FELT BAD OR GUILTY ABOUT YOUR DRINKING: NO
HOW MANY TIMES IN THE PAST YEAR HAVE YOU HAD 5 OR MORE DRINKS IN A DAY: 0
EVER HAD A DRINK FIRST THING IN THE MORNING TO STEADY YOUR NERVES TO GET RID OF A HANGOVER: NO
ON A TYPICAL DAY WHEN YOU DRINK ALCOHOL HOW MANY DRINKS DO YOU HAVE: 2
CONSUMPTION TOTAL: NEGATIVE
TOTAL SCORE: 0

## 2017-11-26 ASSESSMENT — PATIENT HEALTH QUESTIONNAIRE - PHQ9
SUM OF ALL RESPONSES TO PHQ9 QUESTIONS 1 AND 2: 0
SUM OF ALL RESPONSES TO PHQ QUESTIONS 1-9: 0
2. FEELING DOWN, DEPRESSED, IRRITABLE, OR HOPELESS: NOT AT ALL
1. LITTLE INTEREST OR PLEASURE IN DOING THINGS: NOT AT ALL

## 2017-11-26 ASSESSMENT — PAIN SCALES - GENERAL
PAINLEVEL_OUTOF10: 5
PAINLEVEL_OUTOF10: 7
PAINLEVEL_OUTOF10: 7
PAINLEVEL_OUTOF10: 0
PAINLEVEL_OUTOF10: 3
PAINLEVEL_OUTOF10: 4
PAINLEVEL_OUTOF10: 4
PAINLEVEL_OUTOF10: 7

## 2017-11-26 ASSESSMENT — COPD QUESTIONNAIRES
DO YOU EVER COUGH UP ANY MUCUS OR PHLEGM?: NO/ONLY WITH OCCASIONAL COLDS OR INFECTIONS
DURING THE PAST 4 WEEKS HOW MUCH DID YOU FEEL SHORT OF BREATH: NONE/LITTLE OF THE TIME
COPD SCREENING SCORE: 2
HAVE YOU SMOKED AT LEAST 100 CIGARETTES IN YOUR ENTIRE LIFE: YES

## 2017-11-26 ASSESSMENT — ENCOUNTER SYMPTOMS
EYES NEGATIVE: 1
CARDIOVASCULAR NEGATIVE: 1
NEUROLOGICAL NEGATIVE: 1
ABDOMINAL PAIN: 1
RESPIRATORY NEGATIVE: 1
MUSCULOSKELETAL NEGATIVE: 1

## 2017-11-26 NOTE — CARE PLAN
Problem: Infection  Goal: Will remain free from infection    Intervention: Implement standard precautions and perform hand washing before and after patient contact  Pt and family edu provided.

## 2017-11-26 NOTE — PROGRESS NOTES
Assumed care from previous nurse, Bedside report completed, All questions answered at this time. Patient resting comfortably in the bed, all belongings and call light in reach.

## 2017-11-26 NOTE — CARE PLAN
Problem: Safety  Goal: Will remain free from injury  Outcome: PROGRESSING AS EXPECTED    Goal: Will remain free from falls  Outcome: PROGRESSING AS EXPECTED

## 2017-11-26 NOTE — PROGRESS NOTES
Report received from PM RN, care of patient assumed. POC discussed at bedside, no immediate concerns stated at this time. Safety measures in place/call light in reach. Will continue to round hourly. After leaving the room, the patient was yelling loudly in pain. PM RN and this RN went into room to assess. Patient was urinating from penis at this time. Chandrakant RN spoke with patient regarding this issue. Patient states that this happened previous and his urologist has had to fix it before. Patient states his catheter has not been changed during this stay. MD Bailey updated on all of this - instructed nursing to change it out and no urology consult needed at this time.

## 2017-11-26 NOTE — CARE PLAN
Problem: Communication  Goal: The ability to communicate needs accurately and effectively will improve  Outcome: PROGRESSING AS EXPECTED  POC discussed at bedside - patient verbalizes understanding. Interpretation alternatives use at bedside because patient is mostly Slovak speaking.   Education on medications and procedures provided.   White board updated, patient encouraged to call for all needs. Calls appropriately. No immediate concerns at this time.       Problem: Safety  Goal: Will remain free from falls  Outcome: PROGRESSING AS EXPECTED  Macey Jha Fall Risk Assessment:     Last Known Fall: No falls  Mobility: Dizziness/generalized weakness  Medications: Cardiovascular or central nervous system meds  Mental Status/LOC/Awareness: Awake, alert, and oriented to date, place, and person  Toileting Needs: Use of catheters or diversion devices  Volume/Electrolyte Status: No problems  Communication/Sensory: No deficits  Behavior: Appropriate behavior  Macey Jha Fall Risk Total: 4  Fall Risk Level: NO RISK    Universal Fall Precautions:  call light/belongings in reach, bed in low position and locked, siderails up x 2, use non-slip footwear, adequate lighting, educate on level of risk, clutter free and spill free environment, educate to call for assistance    Fall Risk Level Interventions:          Patient Specific Interventions:     Medication: review medications with patient and family and limit combination of prn medications  Mental Status/LOC/Awareness: reinforce falls education, check on patient hourly, reinforce the use of call light and provide activity  Toileting: instruct patient/family on the use of grab bars  Volume/Electrolyte Status: ensure patient remains hydrated, monitor abnormal lab values and ensure IV fluids are appropriate  Communication/Sensory: update plan of care on whiteboard and provide communication alternatives/  Behavioral: engage patient in daily activities  Mobility:  schedule physical activity throughout the day, provide comfort measures during transport, dangle prior to standing, ensure bed is locked and in lowest position and instruct patient to exit bed on their strongest side

## 2017-11-27 VITALS
BODY MASS INDEX: 24.06 KG/M2 | HEART RATE: 73 BPM | OXYGEN SATURATION: 96 % | TEMPERATURE: 98.8 F | WEIGHT: 187.39 LBS | DIASTOLIC BLOOD PRESSURE: 62 MMHG | SYSTOLIC BLOOD PRESSURE: 108 MMHG | RESPIRATION RATE: 18 BRPM

## 2017-11-27 PROBLEM — A41.9 SEPSIS (HCC): Status: RESOLVED | Noted: 2017-11-24 | Resolved: 2017-11-27

## 2017-11-27 LAB
ANION GAP SERPL CALC-SCNC: 11 MMOL/L (ref 0–11.9)
BASOPHILS # BLD AUTO: 0.4 % (ref 0–1.8)
BASOPHILS # BLD: 0.02 K/UL (ref 0–0.12)
BUN SERPL-MCNC: 11 MG/DL (ref 8–22)
CALCIUM SERPL-MCNC: 9.4 MG/DL (ref 8.5–10.5)
CHLORIDE SERPL-SCNC: 98 MMOL/L (ref 96–112)
CO2 SERPL-SCNC: 22 MMOL/L (ref 20–33)
CREAT SERPL-MCNC: 0.7 MG/DL (ref 0.5–1.4)
EOSINOPHIL # BLD AUTO: 0.01 K/UL (ref 0–0.51)
EOSINOPHIL NFR BLD: 0.2 % (ref 0–6.9)
ERYTHROCYTE [DISTWIDTH] IN BLOOD BY AUTOMATED COUNT: 38.7 FL (ref 35.9–50)
GFR SERPL CREATININE-BSD FRML MDRD: >60 ML/MIN/1.73 M 2
GLUCOSE SERPL-MCNC: 97 MG/DL (ref 65–99)
HCT VFR BLD AUTO: 41.5 % (ref 42–52)
HGB BLD-MCNC: 14.5 G/DL (ref 14–18)
IMM GRANULOCYTES # BLD AUTO: 0.04 K/UL (ref 0–0.11)
IMM GRANULOCYTES NFR BLD AUTO: 0.8 % (ref 0–0.9)
LYMPHOCYTES # BLD AUTO: 0.72 K/UL (ref 1–4.8)
LYMPHOCYTES NFR BLD: 13.9 % (ref 22–41)
MCH RBC QN AUTO: 30.7 PG (ref 27–33)
MCHC RBC AUTO-ENTMCNC: 34.9 G/DL (ref 33.7–35.3)
MCV RBC AUTO: 87.7 FL (ref 81.4–97.8)
MONOCYTES # BLD AUTO: 0.38 K/UL (ref 0–0.85)
MONOCYTES NFR BLD AUTO: 7.3 % (ref 0–13.4)
NEUTROPHILS # BLD AUTO: 4.02 K/UL (ref 1.82–7.42)
NEUTROPHILS NFR BLD: 77.4 % (ref 44–72)
NRBC # BLD AUTO: 0 K/UL
NRBC BLD AUTO-RTO: 0 /100 WBC
PLATELET # BLD AUTO: 173 K/UL (ref 164–446)
PMV BLD AUTO: 9.6 FL (ref 9–12.9)
POTASSIUM SERPL-SCNC: 3.3 MMOL/L (ref 3.6–5.5)
RBC # BLD AUTO: 4.73 M/UL (ref 4.7–6.1)
SODIUM SERPL-SCNC: 131 MMOL/L (ref 135–145)
WBC # BLD AUTO: 5.2 K/UL (ref 4.8–10.8)

## 2017-11-27 PROCEDURE — 85025 COMPLETE CBC W/AUTO DIFF WBC: CPT

## 2017-11-27 PROCEDURE — 36415 COLL VENOUS BLD VENIPUNCTURE: CPT

## 2017-11-27 PROCEDURE — 80048 BASIC METABOLIC PNL TOTAL CA: CPT

## 2017-11-27 PROCEDURE — 700102 HCHG RX REV CODE 250 W/ 637 OVERRIDE(OP): Performed by: INTERNAL MEDICINE

## 2017-11-27 PROCEDURE — A9270 NON-COVERED ITEM OR SERVICE: HCPCS | Performed by: HOSPITALIST

## 2017-11-27 PROCEDURE — A9270 NON-COVERED ITEM OR SERVICE: HCPCS | Performed by: INTERNAL MEDICINE

## 2017-11-27 PROCEDURE — 99239 HOSP IP/OBS DSCHRG MGMT >30: CPT | Performed by: HOSPITALIST

## 2017-11-27 PROCEDURE — 700102 HCHG RX REV CODE 250 W/ 637 OVERRIDE(OP): Performed by: HOSPITALIST

## 2017-11-27 RX ORDER — TRAMADOL HYDROCHLORIDE 50 MG/1
50 TABLET ORAL EVERY 6 HOURS PRN
Qty: 30 TAB | Refills: 0 | Status: SHIPPED | OUTPATIENT
Start: 2017-11-27 | End: 2018-01-31

## 2017-11-27 RX ORDER — POTASSIUM CHLORIDE 20 MEQ/1
40 TABLET, EXTENDED RELEASE ORAL ONCE
Status: COMPLETED | OUTPATIENT
Start: 2017-11-27 | End: 2017-11-27

## 2017-11-27 RX ORDER — CIPROFLOXACIN 500 MG/1
500 TABLET, FILM COATED ORAL EVERY 12 HOURS
Qty: 20 TAB | Refills: 0 | Status: SHIPPED | OUTPATIENT
Start: 2017-11-27 | End: 2018-01-31

## 2017-11-27 RX ADMIN — POTASSIUM CHLORIDE 40 MEQ: 1500 TABLET, EXTENDED RELEASE ORAL at 12:27

## 2017-11-27 RX ADMIN — ACETAMINOPHEN 650 MG: 325 TABLET, FILM COATED ORAL at 12:27

## 2017-11-27 RX ADMIN — ACETAMINOPHEN 650 MG: 325 TABLET, FILM COATED ORAL at 00:44

## 2017-11-27 RX ADMIN — POTASSIUM CHLORIDE 40 MEQ: 1500 TABLET, EXTENDED RELEASE ORAL at 09:56

## 2017-11-27 RX ADMIN — STANDARDIZED SENNA CONCENTRATE AND DOCUSATE SODIUM 2 TABLET: 8.6; 5 TABLET, FILM COATED ORAL at 12:27

## 2017-11-27 RX ADMIN — CIPROFLOXACIN HYDROCHLORIDE 500 MG: 500 TABLET, FILM COATED ORAL at 09:56

## 2017-11-27 RX ADMIN — TRAMADOL HYDROCHLORIDE 50 MG: 50 TABLET, COATED ORAL at 09:56

## 2017-11-27 ASSESSMENT — COPD QUESTIONNAIRES
HAVE YOU SMOKED AT LEAST 100 CIGARETTES IN YOUR ENTIRE LIFE: YES
COPD SCREENING SCORE: 2
DO YOU EVER COUGH UP ANY MUCUS OR PHLEGM?: NO/ONLY WITH OCCASIONAL COLDS OR INFECTIONS
DURING THE PAST 4 WEEKS HOW MUCH DID YOU FEEL SHORT OF BREATH: NONE/LITTLE OF THE TIME

## 2017-11-27 ASSESSMENT — PAIN SCALES - GENERAL
PAINLEVEL_OUTOF10: 2
PAINLEVEL_OUTOF10: 0

## 2017-11-27 ASSESSMENT — LIFESTYLE VARIABLES: EVER_SMOKED: NEVER

## 2017-11-27 NOTE — PROGRESS NOTES
Macey Jha Fall Risk Assessment:     Last Known Fall: No falls  Mobility: Dizziness/generalized weakness  Medications: Cardiovascular or central nervous system meds  Mental Status/LOC/Awareness: Awake, alert, and oriented to date, place, and person  Toileting Needs: Use of catheters or diversion devices  Volume/Electrolyte Status: No problems  Communication/Sensory: No deficits  Behavior: Appropriate behavior  Macey Jha Fall Risk Total: 4  Fall Risk Level: NO RISK    Universal Fall Precautions:  call light/belongings in reach, bed in low position and locked, wheelchairs and assistive devices out of sight, siderails up x 2, use non-slip footwear, adequate lighting, clutter free and spill free environment, educate on level of risk, educate to call for assistance    Fall Risk Level Interventions:          Patient Specific Interventions:     Medication: review medications with patient and family  Mental Status/LOC/Awareness: reorient patient, check on patient hourly, utilize bed/chair fall alarm, reinforce the use of call light and provide activity  Toileting: instruct patient/family on the need to call for assistance when toileting  Volume/Electrolyte Status: ensure patient remains hydrated and monitor abnormal lab values  Communication/Sensory: update plan of care on whiteboard and ensure proper positioning when transferrng/ambulating  Behavioral: instruct/reinforce fall program rationale  Mobility: dangle prior to standing, utilize bed/chair fall alarm, ensure bed is locked and in lowest position, provide appropriate assistive device and instruct patient to exit bed on their strongest side

## 2017-11-27 NOTE — PROGRESS NOTES
Assumed care of patient bedside report received from ARJUN Hawkins updated on POC, call light within reach and fall precautions in place. Bed locked and in lowest position. Patient instructed to call for assistance before getting out of bed. All questions answered, no other needs at this time.

## 2017-11-27 NOTE — CARE PLAN
Problem: Communication  Goal: The ability to communicate needs accurately and effectively will improve  Outcome: PROGRESSING AS EXPECTED  Pt educated on POC and medications. Pt verbalized understanding.

## 2017-11-27 NOTE — PROGRESS NOTES
[facility] Hospitalist Progress Note    Date of Service: 2017    Chief Complaint  27 y.o. male admitted 2017 with dysuria and fever    Interval Problem Update  Suprapubic catheter apparently NOt changed in ER. Floor RN changed catheter today    He is c/o pelvic pain, intensity 3/10, dull ache, no radiation, intermittent    Low potasium 3.4    He spiked a fever at 3 pm    Urine culture grew out ecoli    Consultants/Specialty  None    Disposition  home        Review of Systems   Constitutional: Positive for malaise/fatigue.   HENT: Negative.    Eyes: Negative.    Respiratory: Negative.    Cardiovascular: Negative.    Gastrointestinal: Positive for abdominal pain (lower pelvic area).   Genitourinary: Positive for dysuria.   Musculoskeletal: Negative.    Skin: Negative.    Neurological: Negative.    All other systems reviewed and are negative.     Physical Exam  Laboratory/Imaging   Hemodynamics  Temp (24hrs), Av.6 °C (99.7 °F), Min:37.1 °C (98.7 °F), Max:38.8 °C (101.9 °F)   Temperature: (!) 38.8 °C (101.9 °F)  Pulse  Av.6  Min: 69  Max: 127   Blood Pressure: 111/61      Respiratory      Respiration: 20, Pulse Oximetry: 92 %        RUL Breath Sounds: Clear, RML Breath Sounds: Clear, RLL Breath Sounds: Clear, JOSE Breath Sounds: Clear, LLL Breath Sounds: Clear    Fluids    Intake/Output Summary (Last 24 hours) at 17 1920  Last data filed at 17 1119   Gross per 24 hour   Intake              480 ml   Output             3700 ml   Net            -3220 ml       Nutrition  Orders Placed This Encounter   Procedures   • Diet Order     Standing Status:   Standing     Number of Occurrences:   1     Order Specific Question:   Diet:     Answer:   Regular [1]     Physical Exam   Constitutional: He is oriented to person, place, and time. No distress.   Eyes: Left eye exhibits no discharge.   Neck: No tracheal deviation present. No thyromegaly present.   Cardiovascular: Normal rate.  Exam reveals no gallop  and no friction rub.    No murmur heard.  Pulmonary/Chest: Breath sounds normal. No respiratory distress. He has no wheezes.   Abdominal: Bowel sounds are normal. There is tenderness (pelvic area. no mass).   Musculoskeletal: He exhibits no edema or tenderness.   Neurological: He is alert and oriented to person, place, and time. No cranial nerve deficit. Coordination normal.   Skin: He is not diaphoretic.   Nursing note and vitals reviewed.      Recent Labs      11/24/17   1405  11/25/17 0222 11/26/17 0223   WBC  18.1*  18.1*  10.9*   RBC  5.03  4.35*  4.42*   HEMOGLOBIN  15.2  13.4*  13.5*   HEMATOCRIT  43.5  39.2*  39.7*   MCV  86.5  90.1  89.8   MCH  30.2  30.8  30.5   MCHC  34.9  34.2  34.0   RDW  39.5  41.1  41.6   PLATELETCT  196  167  167   MPV  9.3  9.6  9.6     Recent Labs      11/24/17   1405  11/25/17 0222 11/26/17 0223   SODIUM  134*  136  135   POTASSIUM  3.5*  3.4*  3.7   CHLORIDE  103  107  101   CO2  21  21  24   GLUCOSE  104*  107*  93   BUN  11  6*  6*   CREATININE  0.65  0.58  0.72   CALCIUM  9.8  8.9  9.3     Recent Labs      11/24/17   1540   APTT  31.8   INR  1.16*                  Assessment/Plan     * Complicated UTI (urinary tract infection)- (present on admission)   Assessment & Plan    -Secondary to suprapubic catheter  -IV ceftriaxone to po cipro    Follow fever trend          Suprapubic catheter (CMS-[facility])- (present on admission)   Assessment & Plan    Changed on 11/26        Sepsis (CMS-[facility])- (present on admission)   Assessment & Plan    -This is sepsis (without associated acute organ dysfunction).   -See above        Swelling, scrotum- (present on admission)   Assessment & Plan    Improving with abx treatment          check am cbc, bmp    Harris catheter::  Neurogenic Bladder  DVT prophylaxis - mechanical:  SCDs
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
## 2017-11-28 NOTE — PROGRESS NOTES
Pt dc'd to home, pt stated feeling well and ready to go. PIV in RFA dc'd, cath tip intact, pt tolerated with no pain, pressure held at insertion site for 2-3 mins until bleeding stopped then gauze and tape dressing applied. DC edu provided on new meds, home/self care, s/s to call physician, s/s persistent infection at suprapubic cath site, f/u appts, daily catheter care, pt stated understanding and signed dc paperwork, pt taken downstairs by RN in wheelchair to relatives vehicle.  End of treatment.

## 2017-11-28 NOTE — DISCHARGE INSTRUCTIONS
Discharge Instructions    Discharged to home by car with relative. Discharged via wheelchair, hospital escort: Yes.  Special equipment needed: Not Applicable    Be sure to schedule a follow-up appointment with your primary care doctor or any specialists as instructed.     Discharge Plan:   Diet Plan: Discussed  Activity Level: Discussed  Smoking Cessation Offered: Patient Refused    Tramadol extended release tablets or capsules  ¿Qué es jordan medicamento?  El TRAMADOL es un analgésico. Se utiliza para tratar moises moderados o severos en adultos.  Jordan medicamento puede ser utilizado para otros usos; si tiene alguna pregunta consulte con benito proveedor de atención médica o con benito farmacéutico.  MARCAS COMERCIALES DISPONIBLES: ConZip, [place], Ultram ER  ¿Qué le oscar informar a mi profesional de la sonal antes de yosef jordan medicamento?  Necesita saber si usted presenta alguno de los siguientes problemas o situaciones:  -tumor cerebral  -consume más de 3 bebidas alcohólicas por día  -abuso de drogas o drogadicción  -lesión de la valentino  -enfermedad renal o problemas al orinar  -enfermedad hepática  -enfermedad pulmonar, asma o problemas respiratorios  -convulsiones o epilepsia  -cherrie reacción alérgica o inusual al tramadol, a la codeína, a otros medicamentos, alimentos, colorantes o conservantes  -si está embarazada o buscando quedar embarazada  -si está amamantando a un bebé  ¿Cómo oscar utilizar jordan medicamento?  [place] jordan medicamento por vía oral con un vaso de agua. Siga las instrucciones de la etiqueta del medicamento. No andrei, triture ni mástique jordan medicamento. Puede provocar cherrie sobredosis y muerte si triture jordan medicamento. [place] riesgo se incrementa en los pacientes que abusan alcohol u otras sustancias. [place] jordan medicamento a la misma manera cada día, aun con o sin alimentos. Si el medicamento le produce malestar estomacal, tómelo con alimentos o con leche. [place] dosis a intervalos regulares. No tome  benito medicamento con cherrie frecuencia mayor a la indicada.  Hable con benito pediatra para informarse acerca del uso de jordan medicamento en niños. Jordan medicamento no está aprobado para usar en niños.  Sobredosis: Póngase en contacto inmediatamente con un centro toxicológico o cherrie amador de urgencia si usted shyla que haya tomado demasiado medicamento.  ATENCIÓN: [place] medicamento es solo para usted. No comparta jordan medicamento con nadie.  ¿Qué sucede si me olvido de cherrie dosis?  Si olvida cherrie dosis, tómela lo antes posible. Si es cristi la hora de la próxima dosis, tome sólo ibis dosis. No tome dosis adicionales o dobles.  ¿Qué puede interactuar con jordan medicamento?  No tome esta medicina con ninguno de los siguientes medicamentos:  -IMAOs, tales jordan Carbex, Eldepryl, Marplan, [place] y [place]  Esta medicina también puede interactuar con los siguientes medicamentos:  -alcohol o medicamentos que contienen alcohol  -antihistamínicos  -benzodiacepinas  -bupropion  -carbamazepina u oxcarbazepina  -clozapina  -ciclobenzaprina  -digoxina  -furazolidona  -linezolid  -medicamentos para la depresión, ansiedad o trastornos psicóticos  -medicamentos para las migrañas, tales jordan almotriptán, eletriptán, frovatriptán, naratriptán, rizatriptán, sumatriptán, zolmitriptán  -analgésicos, incluyendo pentazocina, buprenorfina, butorfanol, meperidina, nalbufina y propoxifeno  -medicamentos para conciliar el sueño  -relajantes musculares  -naltrexona  -fenobarbital  -fenotiazinas, tales jordan perfenacina, tioridazina, clorpromacina, mesoridazina, flufenazina, proclorperazina, promazina y trifluoperazina  -procarbazina  -warfarina  Puede ser que esta lista no menciona todas las posibles interacciones. Informe a benito profesional de la sonal de todos los productos a base de hierbas, medicamentos de venta kimberly o suplementos nutritivos que esté tomando. Si usted fuma, consume bebidas alcohólicas o si utiliza drogas ilegales, indíqueselo también a benito  profesional de la sonal. Algunas sustancias pueden interactuar con benito medicamento.  ¿A qué oscar estar atento al usar jordan medicamento?  Si el dolor no desaparece, si empeora o si experimenta un dolor nuevo o de tipo diferente, consulte a benito médico o a benito profesional de la sonal. Usted puede desarrollar tolerancia al medicamento. La tolerancia significa que necesitará cherrie dosis más tammi para aliviar el dolor. Tolerancia es normal y esperada cuando esté tomando jordan medicamento por un beth período de tiempo.  No suspenda el uso de benito medicamento repentinamente debido a que puede desarrollar cherrie reacción severa. Benito cuerpo se acostumbra a jordan medicamento. [place] NO significa que sea adicto. La adicción es un comportamiento que hace referencia a la obtención y utilización de un medicamento con fines que no son médicos. Si tiene dolor, existe cherrie razón médica para que usted tome un analgésico. Benito médico le indicará la cantidad de medicamento que necesitará yosef. Si benito médico desea que pare el tratamiento, la dosis será reducida gradualmente para evitar efectos secundarios.  Puede experimentar mareos o somnolencia. No conduzca ni utilice maquinaria, ni dionicio nada que le exija permanecer en estado de alerta hasta que sepa cómo le afecta jordan medicamento. No se siente ni se ponga de pie con rapidez, especialmente si es un paciente de edad avanzada. [place] reduce el riesgo de mareos o desmayos. El alcohol puede aumentar o disminuir el efecto de jordan medicamento. Evite consumir bebidas alcohólicas.  [place] medicamento puede causar estreñimiento. Trate de evacuar los intestinos al menos cada 2 ó 3 días. Si no evacua los intestinos earnest 3 días, comuníquese con benito médico o con benito profesional de la sonal.  Se le podrá secar la boca. Masticar chicle sin azúcar, chupar caramelos duros y yosef agua en abundancia le ayudará a mantener la boca húmeda. Si el problema no desaparece o es severo, consulte a benito médico.  ¿Qué efectos  secundarios puedo tener al utilizar jordan medicamento?  Efectos secundarios que debe informar a benito médico o a benito profesional de la sonal tan pronto jordan sea posible:  -reacciones alérgicas jordan erupción cutánea, picazón o urticarias, hinchazón de la teresa, labios o lengua  -problemas respiratorios  -confusión  -sensación de desmayos o mareos, caídas  -picazón  -enrojecimiento, formación de ampollas, descamación o aflojamiento de la piel, inclusive dentro de la boca  -convulsiones  Efectos secundarios que, por lo general, no requieren atención médica (debe informarlos a benito médico o a benito profesional de la sonal si persisten o si son molestos):  -estreñimiento  -mareos  -somnolencia  -dolor de valentino  -náuseas, vómitos  Puede ser que esta lista no menciona todos los posibles efectos secundarios. Comuníquese a benito médico por asesoramiento médico sobre los efectos secundarios. Usted puede informar los efectos secundarios a la FDA por teléfono al [phone]-FDA-[phone].  ¿Dónde oscar guardar mi medicina?  Manténgala fuera del alcance de los niños.  [place]árdela a temperatura ambiente, entre 15 y 30 grados C (59 y 86 grados F). Mantenga el envase yeny cerrado. Deseche los medicamentos que no haya utilizado, después de la fecha de vencimiento.  ATENCIÓN: [place] folleto es un resumen. Puede ser que no cubra toda la posible información. Si usted tiene preguntas acerca de esta medicina, consulte con benito médico, benito farmacéutico o benito profesional de la sonal.  © 2014, Elsevier/Gold Standard. (9/28/2011 5:13:15 PM)        Ciprofloxacin extended-release tablets  ¿Qué es jordan medicamento?  La CIPROFLOXACINA es un antibiótico quinolónico. Se utiliza en el tratamiento de ciertos tipos de infecciones bacterianas. No es efectivo para resfríos, gripe u otras infecciones de origen viral.  [place] medicamento puede ser utilizado para otros usos; si tiene alguna pregunta consulte con benito proveedor de atención médica o con benito farmacéutico.  MARCAS COMERCIALES  DISPONIBLES: Cipro XR, Proquin XR  ¿Qué le oscar informar a mi profesional de la sonal antes de yosef jordan medicamento?  Necesita saber si usted presenta alguno de los siguientes problemas o situaciones:  -problemas de huesos  -enfermedad cerebral  -pulso cardíaco irregular  -problemas articulares  -enfermedad renal  -enfermedad hepática  -miastenia gravis  -trastorno de convulsiones  -problemas de tendones  -cherrie reacción alérgica o inusual a la ciprofloxacina, a otros antibióticos, medicamentos, alimentos, colorantes o conservantes  -si está embarazada o buscando quedar embarazada  -si está amamantando a un bebé  ¿Cómo oscar utilizar jordan medicamento?  [place] jordan medicamento por vía oral con un vaso lleno de agua. Siga las instrucciones de la etiqueta del medicamento. No parta, triture ni mastique las tabletas. [place] dayton dosis a intervalos regulares. No tome benito medicamento con cherrie frecuencia mayor a la indicada. [place] todas las dosis de benito medicamento jordan se le haya indicado aun si se siente mejor. No omita ninguna dosis o suspenda el uso de benito medicamento antes de lo indicado.  Ojrdan medicamento se puede yosef con las comidas o con el estómago vacío. No se debe yosef junto con productos lácteos, tales jordan leche, yogur o jugos fortificados con calcio, sin embargo se puede yosef con comidas que contienen productos lácteos.  Benito farmacéutico le dará cherrie Guía del medicamento especial con cada receta y relleno. Asegúrese de leer esta información cada vez cuidadosamente.  Hable con benito pediatra para informarse acerca del uso de jordan medicamento en niños. Puede requerir atención especial.  Sobredosis: Póngase en contacto inmediatamente con un centro toxicológico o cherrie amador de urgencia si usted shyla que haya tomado demasiado medicamento.  ATENCIÓN: [place] medicamento es solo para usted. No comparta jordan medicamento con nadie.  ¿Qué sucede si me olvido de cherrie dosis?  Si olvida cherrie dosis, tómela lo antes posible. Si es cristi la hora  de la próxima dosis, tome sólo ibis dosis. No tome dosis adicionales o dobles.  ¿Qué puede interactuar con jordan medicamento?  No tome esta medicina con ninguno de los siguientes medicamentos:  -cisapride  -droperidol  -terfenadina  -tizanidina  Esta medicina también puede interactuar con los siguientes medicamentos  -antiácidos  -cafeína  -ciclosporina  -polvo o tabletas tamponadas de didanosina (ddI)  -medicamentos para la diabetes  -medicamentos para la inflamación, tales jordan ibuprofeno o naproxeno  -metotrexato  -multivitaminas  -omeprazol  -fenitoína  -probenecid  -sucralfato  -teofilina  -warfarina  Puede ser que esta lista no menciona todas las posibles interacciones. Informe a benito profesional de la sonal de todos los productos a base de hierbas, medicamentos de venta kimberly o suplementos nutritivos que esté tomando. Si usted fuma, consume bebidas alcohólicas o si utiliza drogas ilegales, indíqueselo también a benito profesional de la sonal. Algunas sustancias pueden interactuar con benito medicamento.  ¿A qué oscar estar atento al usar jordan medicamento?  Si los síntomas no mejoran, consulte con benito médico o con benito profesional de la sonal.  No trate la diarrea con productos de venta kimberly. Comuníquese con benito médico si tiene diarrea que dura más de 2 días o si es severa y acuosa.  Puede experimentar somnolencia o mareos. No conduzca ni utilice maquinaria, ni dionicio nada que le exija permanecer en estado de alerta hasta que sepa cómo le afecta jordan medicamento. No se siente ni se ponga de pie con rapidez, especialmente si es un paciente de edad avanzada. [place] reduce el riesgo de mareos o desmayos.  [place] medicamento puede aumentar la sensibilidad al sol. Manténgase fuera zachary solar. Si no lo puede evitar, utilice ropa protectora y crema de protección solar. No utilice lámparas parker, nicolasa parker ni cabinas parker.  Evite las preparaciones de antiácidos, aluminio, calcio, jackson, magnesio o cinc por lo menos 6 horas  antes o 2 horas después de yosef jordan medicamento.  ¿Qué efectos secundarios puedo tener al utilizar jordan medicamento?  Efectos secundarios que debe informar a benito médico o a benito profesional de la sonal tan pronto jordan sea posible:  -reacciones alérgicas jordan erupción cutánea, picazón o urticarias, hinchazón de la teresa, labios o lengua  -problemas respiratorios  -confusión, pesadillas o alucinaciones  -sensación de desmayos o aturdimiento, caídas  -pulso cardiaco irregular  -hinchazón o moises musculares, articulares o de tendones  -dolor o dificultad para orinar  -dolor de valentino persistente con o sin visión borrosa  -enrojecimiento, formación de ampollas, descamación o aflojamiento de la piel, inclusive dentro de la boca  -convulsiones  -dolor, entumecimiento, hormigueo o debilidad inusual  Efectos secundarios que, por lo general, no requieren atención médica (debe informarlos a benito médico o a benito profesional de la sonal si persisten o si son molestos):  -diarrea  -náuseas o malestar estomacal  -manchas kasie o llagas dentro de la boca  Puede ser que esta lista no menciona todos los posibles efectos secundarios. Comuníquese a benito médico por asesoramiento médico sobre los efectos secundarios. Usted puede informar los efectos secundarios a la FDA por teléfono al 1-800FDA-2705.  ¿Dónde oscar guardar mi medicina?  [place] fuera del alcance de los niños.  [place]árd[place] a temperatura ambiente, entre 15 y 30 grados C (59 y 86 grados F). Mantenga el envase yeny cerrado. Deseche los medicamentos que no haya utilizado, después de la fecha de vencimiento.  ATENCIÓN: [place] folleto es un resumen. Puede ser que no cubra toda la posible información. Si usted tiene preguntas acerca de esta medicina, consulte con benito médico, benito farmacéutico o benito profesional de la sonal.  © 2014, Elsevier/Gold Standard. (2/22/2013 1:21:25 PM)    Confirmed Follow up Appointment: Patient to Call and Schedule Appointment  Confirmed Symptoms Management:  Discussed  Medication Reconciliation Updated: Yes  Influenza Vaccine Indication: Patient Refuses    I understand that a diet low in cholesterol, fat, and sodium is recommended for good health. Unless I have been given specific instructions below for another diet, I accept this instruction as my diet prescription.   Other diet:         Special Instructions: None    · Is patient discharged on Warfarin / Coumadin?   No     · Is patient Post Blood Transfusion?  No    Depression / Suicide Risk    As you are discharged from this [facility] facility, it is important to learn how to keep safe from harming yourself.    Recognize the warning signs:  · Abrupt changes in personality, positive or negative- including increase in energy   · Giving away possessions  · Change in eating patterns- significant weight changes-  positive or negative  · Change in sleeping patterns- unable to sleep or sleeping all the time   · Unwillingness or inability to communicate  · Depression  · Unusual sadness, discouragement and loneliness  · Talk of wanting to die  · Neglect of personal appearance   · Rebelliousness- reckless behavior  · Withdrawal from people/activities they love  · Confusion- inability to concentrate     If you or a loved one observes any of these behaviors or has concerns about self-harm, here's what you can do:  · Talk about it- your feelings and reasons for harming yourself  · Remove any means that you might use to hurt yourself (examples: pills, rope, extension cords, firearm)  · Get professional help from the community (Mental Health, Substance Abuse, psychological counseling)  · Do not be alone:Call your Safe Contact- someone whom you trust who will be there for you.  · Call your local CRISIS HOTLINE 983-4444 or 541-401-7178  · Call your local Children's Mobile Crisis Response Team [place] (335) 219-1269 or www.iZ3D  · Call the toll free National Suicide Prevention Hotlines   · National Suicide  Prevention Lifeline 972-121-NXEQ (1227)  · National [place] Line Network 800-SUICIDE (934-0255)

## 2017-11-29 LAB
BACTERIA BLD CULT: NORMAL
BACTERIA BLD CULT: NORMAL
SIGNIFICANT IND 70042: NORMAL
SIGNIFICANT IND 70042: NORMAL
SITE SITE: NORMAL
SITE SITE: NORMAL
SOURCE SOURCE: NORMAL
SOURCE SOURCE: NORMAL

## 2018-01-19 ENCOUNTER — HOSPITAL ENCOUNTER (OUTPATIENT)
Dept: RADIOLOGY | Facility: MEDICAL CENTER | Age: 28
End: 2018-01-19
Attending: UROLOGY
Payer: COMMERCIAL

## 2018-01-19 DIAGNOSIS — S39.848D: ICD-10-CM

## 2018-01-19 DIAGNOSIS — N35.014 POST-TRAUMATIC URETHRAL STRICTURE, MALE, UNSPECIFIED: ICD-10-CM

## 2018-01-19 PROCEDURE — 76870 US EXAM SCROTUM: CPT

## 2018-01-31 ENCOUNTER — APPOINTMENT (OUTPATIENT)
Dept: RADIOLOGY | Facility: IMAGING CENTER | Age: 28
End: 2018-01-31
Attending: PHYSICIAN ASSISTANT
Payer: COMMERCIAL

## 2018-01-31 ENCOUNTER — OCCUPATIONAL MEDICINE (OUTPATIENT)
Dept: URGENT CARE | Facility: CLINIC | Age: 28
End: 2018-01-31
Payer: COMMERCIAL

## 2018-01-31 VITALS
HEART RATE: 98 BPM | WEIGHT: 200 LBS | DIASTOLIC BLOOD PRESSURE: 98 MMHG | RESPIRATION RATE: 16 BRPM | OXYGEN SATURATION: 98 % | HEIGHT: 66 IN | TEMPERATURE: 98.6 F | BODY MASS INDEX: 32.14 KG/M2 | SYSTOLIC BLOOD PRESSURE: 140 MMHG

## 2018-01-31 DIAGNOSIS — E66.9 OBESITY (BMI 30-39.9): ICD-10-CM
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
## 2018-01-31 DIAGNOSIS — S97.81XA CRUSHING INJURY OF RIGHT FOOT, INITIAL ENCOUNTER: Primary | ICD-10-CM

## 2018-01-31 DIAGNOSIS — S97.81XA CRUSHING INJURY OF RIGHT FOOT, INITIAL ENCOUNTER: ICD-10-CM

## 2018-01-31 PROCEDURE — 99203 OFFICE O/P NEW LOW 30 MIN: CPT | Mod: 29 | Performed by: PHYSICIAN ASSISTANT

## 2018-01-31 PROCEDURE — 73630 X-RAY EXAM OF FOOT: CPT | Mod: TC,RT | Performed by: PHYSICIAN ASSISTANT

## 2018-01-31 NOTE — LETTER
"EMPLOYEE’S CLAIM FOR COMPENSATION/ REPORT OF INITIAL TREATMENT  FORM C-4    EMPLOYEE’S CLAIM - PROVIDE ALL INFORMATION REQUESTED   First Name  Chandrakant Last Name  Nino Salazar Birthdate                    1990                Sex  male Claim Number   Home Address  [address] Age  27 y.o. Height  1.676 m (5' 6\") Weight  90.7 kg (200 lb) [facility]     [facility] Zip  [address] Telephone  [phone] (home)    Mailing Address  [address] [facility] Zip  [address] Primary Language Spoken  English    Insurer  S [employer] claims  Third Party   [employer] Claims   Employee's Occupation (Job Title) When Injury or Occupational Disease Occurred  [employer]     Employer's Name  [employer]  Telephone  [phone]    Employer Address  [address]  [facility]  Zip  [address]   Date of Injury  1/31/2018               Hour of Injury  2:00 PM Date Employer Notified  1/31/2018 Last Day of Work after Injury or Occupational Disease  1/31/2018 Supervisor to Whom Injury Reported  [facility]   Address or Location of Accident (if applicable)  [[place] ]   What were you doing at the time of accident? (if applicable)  Walking truss came down to foot     How did this injury or occupational disease occur? (Be specific an answer in detail. Use additional sheet if necessary)  Walking along bludry tuss came down    If you believe that you have an occupational disease, when did you first have knowledge of the disability and it relationship to your employment?  N/A  Witnesses to the Accident  Paris Saver       Nature of Injury or Occupational Disease  Defer  Part(s) of Body Injured or Affected  Foot (R), Defer, Defer    I certify that the above is true and correct to the best of my knowledge and that I have provided this information in order to obtain the benefits of [place]’s " [place]  I hereby authorize any physician, chiropractor, surgeon, practitioner, or other person, any hospital, including [facility] or [facility], any medical service organization, any insurance company, or other institution or organization to release to each other, any medical or other information, including benefits paid or payable, pertinent to this injury or disease, except information relative to diagnosis, treatment and/or counseling for AIDS, psychological conditions, alcohol or controlled substances, for which I must give specific authorization.  A Photostat of this authorization shall be as valid as the original.     Date   Place   Employee’s Signature   THIS REPORT MUST BE COMPLETED AND MAILED WITHIN 3 WORKING DAYS OF TREATMENT   Place  [facility]  Name of [facility]   Date  1/31/2018 Diagnosis  (S97.81XA) Crushing injury of right foot, initial encounter  (primary encounter diagnosis)  (Y99.0) Work related injury Is there evidence the injured employee was under the influence of alcohol and/or another controlled substance at the time of accident?   Hour  3:16 PM Description of Injury or Disease  The primary encounter diagnosis was Crushing injury of right foot, initial encounter. A diagnosis of Work related injury was also pertinent to this visit. No   Treatment  Rest, ice, ace wrap, walking boot, crutches and OTC ibuprofen for pain  Have you advised the patient to remain off work five days or more? No   X-Ray Findings  Negative   If Yes   From Date  To Date      From information given by the employee, together with medical evidence, can you directly connect this injury or occupational disease as job incurred?  Yes If No Full Duty  No Modified Duty  Yes   Is additional medical care by a physician indicated?  Yes If Modified Duty, Specify any Limitations /  "Restrictions  Pt will be on crutches and no use of right foot with weightbearing until follow up appt. Desk type job is possible   Do you know of any previous injury or disease contributing to this condition or occupational disease?                            No   Date  1/31/2018 Print Doctor’s Name Ruben Wilkerson P.A.-C. I certify the employer’s copy of  this form was mailed on:   Address  [address] Insurer’s Use Only     [facility] Zip  [address]    Provider’s Tax ID Number  [id number] Telephone  Dept: [phone]        e-RUBEN Boyd P.A.-C.   e-Signature: Dr. Charles Howard, Medical Director Degree  JOHN        ORIGINAL-TREATING PHYSICIAN OR CHIROPRACTOR    PAGE 2-INSURER/TPA    PAGE 3-EMPLOYER    PAGE 4-EMPLOYEE             Form C-4 (rev10/07)              BRIEF DESCRIPTION OF RIGHTS AND BENEFITS  (Pursuant to [place]RS 616C.050)    Notice of Injury or Occupational Disease (Incident Report Form C-1): If an injury or occupational disease (OD) arises out of and in the  course of employment, you must provide written notice to your employer as soon as practicable, but no later than 7 days after the accident or  OD. Your employer shall maintain a sufficient supply of the required forms.    Claim for Compensation (Form C-4): If medical treatment is sought, the form C-4 is available at the place of initial treatment. A completed  \"Claim for Compensation\" (Form C-4) must be filed within 90 days after an accident or OD. The treating physician or chiropractor must,  within 3 working days after treatment, complete and mail to the employer, the employer's insurer and third-party , the Claim for  Compensation.    Medical Treatment: If you require medical treatment for your on-the-job injury or OD, you may be required to select a physician or  chiropractor from a list provided by your workers’ compensation insurer, if it has contracted with an Organization for Managed Care (O) " or  Preferred Provider Organization (PPO) or providers of health care. If your employer has not entered into a contract with an MCO or PPO, you  may select a physician or chiropractor from the Panel of Physicians and Chiropractors. Any medical costs related to your industrial injury or  OD will be paid by your insurer.    Temporary Total Disability (TTD): If your doctor has certified that you are unable to work for a period of at least 5 consecutive days, or 5  cumulative days in a 20-day period, or places restrictions on you that your employer does not accommodate, you may be entitled to TTD  compensation.    Temporary Partial Disability (TPD): If the wage you receive upon reemployment is less than the compensation for TTD to which you are  entitled, the insurer may be required to pay you TPD compensation to make up the difference. TPD can only be paid for a maximum of 24  months.    Permanent Partial Disability (PPD): When your medical condition is stable and there is an indication of a PPD as a result of your injury or  OD, within 30 days, your insurer must arrange for an evaluation by a rating physician or chiropractor to determine the degree of your PPD. The  amount of your PPD award depends on the date of injury, the results of the PPD evaluation and your age and wage.    Permanent Total Disability (PTD): If you are medically certified by a treating physician or chiropractor as permanently and totally disabled  and have been granted a PTD status by your insurer, you are entitled to receive monthly benefits not to exceed 66 2/3% of your average  monthly wage. The amount of your PTD payments is subject to reduction if you previously received a PPD award.    Vocational Rehabilitation Services: You may be eligible for vocational rehabilitation services if you are unable to return to the job due to a  permanent physical impairment or permanent restrictions as a result of your injury or occupational  disease.    Transportation and Per Konstantin Reimbursement: You may be eligible for travel expenses and per konstantin associated with medical treatment.    Reopening: You may be able to reopen your claim if your condition worsens after claim closure.    Appeal Process: If you disagree with a written determination issued by the insurer or the insurer does not respond to your request, you may  appeal to the Department of Administration, , by following the instructions contained in your determination letter. You must  appeal the determination within 70 days from the date of the determination letter at [address], [address], [place], [place]  [address], or [address], [address], [place], [place] [address]. If you disagree with the  decision, you may appeal to the  Department of Administration, . You must file your appeal within 30 days from the date of the  decision  letter at [address], [address], [place], [place] [address], or [address], [place], [place] [address]. If you  disagree with a decision of an , you may file a petition for judicial review with the District Court. You must do so within 30  days of the Appeal Officer’s decision. You may be represented by an  at your own expense or you may contact the [facility] for possible  representation.    [place]  for Injured Workers (NAIW): If you disagree with a  decision, you may request that NAIW represent you  without charge at an  Hearing. For information regarding denial of benefits, you may contact the [facility] at: [address], [address], [place] [address], [phone], or [address], [place] [address], [phone]    To File a Complaint with the Division: If you wish to file a complaint with the  of the Division of Industrial Relations (DIR),  please contact  the Workers’ Compensation Section, [address], [address], [place], [place] [address], telephone (855) 738-9890, or  [address], [address], [place], [place] [address], telephone [phone].    For assistance with Workers’ Compensation Issues: you may contact the Office of the [facility] Consumer Health Assistance, [address] [place] [address], [address], [place], [place] [address], Toll Free 1-459.110.1364, Web site: http://govcha.[email or web address].[place]., E-mail  [email or web address].[email or web address].                                                                                                                                                                                                                                   __________________________________________________________________                                                                   _________________                Employee Name / Signature                                                                                                                                                       Date                                                                                                                                                                                                     D-2 (rev. 10/07)

## 2018-01-31 NOTE — LETTER
"   [facility]  [address] [address]  Phone:  [phone] - Fax:  [phone]   [facility] Progress Report and Disability Certification  Date of Service: 2018   No Show:  No  Date / Time of Next Visit: 2018 @ 4:15 PM    Claim Information   Patient Name: Chandrakant Salazar  Claim Number:     Employer: [employer]  Date of Injury: 2018     Insurer / TPA: [employer]  ID / SSN:     Occupation: [employer]   Diagnosis: The primary encounter diagnosis was Crushing injury of right foot, initial encounter. A diagnosis of Work related injury was also pertinent to this visit.    Medical Information   Related to Industrial Injury? Yes    Subjective Complaints:  DOI: 18  Pt notes while at work a heavy truss fell on his right foot causing a crush injury. Pt note able to walk or bear weight on his right foot. Pt has not taken any Rx medications for this condition. Pt states the pain is a 4-5/10, aching in nature and worse \"right now\". Pt denies CP, SOB, NVD, paresthesias, headaches, dizziness, change in vision, hives, or other joint pain. The pt's medication list, problem list, and allergies have been evaluated and reviewed during today's visit.     Objective Findings: Right foot: Upon inspection, mild ecchymoses, mild  edema, no erythema. +TTP, +AROM, +SILT, STR 5/5, DP 2+ B/L        Pre-Existing Condition(s):     Assessment:   Initial Visit    Status: Additional Care Required  Permanent Disability:No    Plan: Medication  Comments:OTC ibuprofen    Diagnostics: X-ray  Comments:NEG    Comments:       Disability Information   Status: Released to Restricted Duty    From:  2018  Through: 2018 Restrictions are: Temporary   Physical Restrictions   Sitting:  < or = to 6 hrs/day Standing:  < or = to 1 hr/day Stoopin hrs/day Bending:      Squattin hrs/day Walking:  < or = to 1 hr/day Climbin hrs/day Pushin hrs/day   "   Pullin hrs/day Other:    Reaching Above Shoulder (L):   Reaching Above Shoulder (R):       Reaching Below Shoulder (L):    Reaching Below Shoulder (R):      Not to exceed Weight Limits   Carrying(hrs):   Weight Limit(lb): < or = to 10 pounds Lifting(hrs):   Weight  Limit(lb): < or = to 10 pounds   Comments: Pt to wear walker boot and use crutches for ambulation. A desk type job would be best.    Repetitive Actions   Hands: i.e. Fine Manipulations from Grasping:     Feet: i.e. Operating Foot Controls: 0 hrs/day   Driving / Operate Machinery: 0 hrs/day   Physician Name: Ruben Wilkerson P.A.-C. Physician Signature: RUBEN Larson P.A.-C. e-Signature: Dr. Charles Howard, Medical Director   Clinic Name / Location: [address] [address] Clinic Phone Number: Dept: [phone]   Appointment Time: 3:00 Pm Visit Start Time: 3:16 PM   Check-In Time:  3:02 Pm Visit Discharge Time:  5:08 PM    Original-Treating Physician or Chiropractor    Page 2-Insurer/TPA    Page 3-Employer    Page 4-Employee

## 2018-02-01 NOTE — PROGRESS NOTES
"Subjective:      Pt is a 27 y.o. male who presents with Foot Problem (NEW WC Left foot pain )      DOI: 1/31/18  Pt notes while at work a heavy truss fell on his right foot causing a crush injury. Pt note able to walk or bear weight on his right foot. Pt has not taken any Rx medications for this condition. Pt states the pain is a 4-5/10, aching in nature and worse \"right now\". Pt denies CP, SOB, NVD, paresthesias, headaches, dizziness, change in vision, hives, or other joint pain. The pt's medication list, problem list, and allergies have been evaluated and reviewed during today's visit.   Pt denies second job.    HPI  PMH:  Negative per pt.      PSH:  Past Surgical History:   Procedure Laterality Date   • SCROTAL EXPLORATION Bilateral 10/28/2017    Procedure: SCROTAL EXPLORATION;  Surgeon: Mike Castro M.D.;  Location: SURGERY [facility];  Service: Urology   • CYSTOSCOPY N/A 10/28/2017    Procedure: CYSTOSCOPY - possible suprapubic cath placement;  Surgeon: Mike Castro M.D.;  Location: SURGERY [facility];  Service: Urology   • OTHER ABDOMINAL SURGERY      suprapubic cath insert       Fam Hx:  the patient's family history is not pertinent to their current complaint    Soc HX:  Social History     Social History   • Marital status: Single     Spouse name: N/A   • Number of children: N/A   • Years of education: N/A     Occupational History   • Not on file.     Social History Main Topics   • Smoking status: Never Smoker   • Smokeless tobacco: Never Used      Comment: social smoker   • Alcohol use No   • Drug use: No   • Sexual activity: Not on file     Other Topics Concern   • Not on file     Social History Narrative    ** Merged History Encounter **              Medications:  No current outpatient prescriptions on file.      Allergies:  Patient has no known allergies.    ROS  Constitutional: Negative for fever, chills and malaise/fatigue.   HENT: Negative for congestion and sore throat.    Eyes: " "Negative for blurred vision, double vision and photophobia.   Respiratory: Negative for cough and shortness of breath.    Cardiovascular: Negative for chest pain and palpitations.   Gastrointestinal: Negative for heartburn, nausea, vomiting, abdominal pain, diarrhea and constipation.   Genitourinary: Negative for dysuria and flank pain.   Musculoskeletal: POS for right foot joint pain and myalgias.   Skin: Negative for itching and rash.   Neurological: Negative for dizziness, tingling and headaches.   Endo/Heme/Allergies: Does not bruise/bleed easily.   Psychiatric/Behavioral: Negative for depression. The patient is not nervous/anxious.           Objective:     /98   Pulse 98   Temp 37 °C (98.6 °F)   Resp 16   Ht 1.676 m (5' 6\")   Wt 90.7 kg (200 lb)   SpO2 98%   BMI 32.28 kg/m²      Physical Exam    Right foot: Upon inspection, mild ecchymoses, mild  edema, no erythema. +TTP, +AROM, +SILT, STR 5/5, DP 2+ B/L       Constitutional: PT is oriented to person, place, and time. PT appears well-developed and well-nourished. No distress.   HENT:   Head: Normocephalic and atraumatic.   Mouth/Throat: Oropharynx is clear and moist. No oropharyngeal exudate.   Eyes: Conjunctivae normal and EOM are normal. Pupils are equal, round, and reactive to light.   Neck: Normal range of motion. Neck supple. No thyromegaly present.   Cardiovascular: Normal rate, regular rhythm, normal heart sounds and intact distal pulses.  Exam reveals no gallop and no friction rub.    No murmur heard.  Pulmonary/Chest: Effort normal and breath sounds normal. No respiratory distress. PT has no wheezes. PT has no rales. Pt exhibits no tenderness.   Abdominal: Soft. Bowel sounds are normal. PT exhibits no distension and no mass. There is no tenderness. There is no rebound and no guarding.   Neurological: PT is alert and oriented to person, place, and time. PT has normal reflexes. No cranial nerve deficit.   Skin: Skin is warm and dry. No rash " noted. PT is not diaphoretic. No erythema.       Psychiatric: PT has a normal mood and affect. PT behavior is normal. Judgment and thought content normal.      RADS:  Narrative       1/31/2018 4:03 PM    HISTORY/REASON FOR EXAM:  Pain/Deformity Following Trauma.      TECHNIQUE/EXAM DESCRIPTION AND NUMBER OF VIEWS:  3 views of the  RIGHT foot.    COMPARISON: None    FINDINGS:    MINERALIZATION: Mineralization is unremarkable for age.    INJURY: No acute fracture or gross malalignment is seen.    JOINTS: No erosive arthropathy is evident.       Impression       No radiographic evidence of acute traumatic injury.   Reading Provider Reading Date   Sarabjit Choe M.D. Jan 31, 2018   Signing Provider Signing Date Signing Time   Sarabjit Choe M.D. Jan 31, 2018  4:27 PM       Assessment/Plan:     1. Crushing injury of right foot, initial encounter    - DX-FOOT-COMPLETE 3+ RIGHT; Future    2. Work related injury    - DX-FOOT-COMPLETE 3+ RIGHT; Future    3. Obesity (BMI 30-39.9)  Based on the electronic medical record calculations for BMI: the pt was diagnosed with obesity. Obesity counseling discussed concerning diet and exercise improvements. Pt was in full understanding with information given and plan set forth.    Ace wrap to RLE  Walker boot to RLE  Crutches for ambulation given  RICE therapy discussed  Gentle ROM exercises discussed  WBAT RLE  Ice/heat therapy discussed  OTC ibuprofen for pain control  Rest, fluids encouraged.  AVS with medical info given.  Pt was in full understanding and agreement with the plan.  Follow-up on 2/5/18 for next WC appt.

## 2018-02-01 NOTE — PATIENT INSTRUCTIONS
Reposo, hielo, compresión y elevación: Cuidados de rutina para los traumatismos   (RICE: Routine Care for Injuries)  Los cuidados de rutina de muchas lesiones incluyen reposo, hielo, compresión y elevación.  INSTRUCCIONES PARA EL CUIDADO EN EL HOGAR   · El reposo es necesario para permitir que el cuerpo se cure. Podrán reanudarse las actividades de rutina cuando se sienta cómodo. Las lesiones en tendones (estructuras similares a cherrie cuerda que unen los músculos al hueso) y huesos demoran aproximadamente seis semanas en curarse.  · Si aplicamos hielo luego de cherrie lesión, podemos evitar la hinchazón y reducir el dolor.  · Ponga el hielo en cherrie bolsa plástica.  · Colóquese cherrie toalla entre la piel y la bolsa de hielo.  · Deje el hielo earnest 15 a 20 minutos, 3 a 4 veces por día. Hágalo mientras se encuentre despierto, earnest las primeras 24 a 48 horas. Luego, aplíquelo según las indicaciones del profesional que lo asiste.  · La compresión ayuda a reducir la hinchazón. También zully sostén y ayuda a aliviar las molestias. Si hoy le treviño colocado un vendaje elástico, debe retirarlo y colocarlo nuevamente cada 3 ó 4 horas. No debe colocarlo de forma muy apretada, kamilla sí con la firmeza necesaria para evitar la hinchazón. Vigile los dedos de las benji o de los pies y observe si se hinchan, se tornan azules, se enfrían o entumecen o siente dolor intenso. Si se presentan algunos de estos síntomas (problemas), retire el vendaje y aplíquelo nuevamente de un modo más flojo. Si estos síntomas persisten, contacte al profesional que lo asiste.  · La elevación ayuda a reducir la hinchazón y disminuye el dolor. Si la lesión se localiza en las extremidades (brazos/benji y piernas/pies), la rafal lesionada debería colocarse por arriba de la altura del corazón, de ser posible.  SOLICITE ATENCIÓN MÉDICA DE INMEDIATO SI:   · El dolor o la hinchazón persisten.  · La rafal está [place]her, adormecida o la siente débil.  · Los síntomas empeoran  en vez de mejorar earnest algunos días.  Estos síntomas pueden indicar que es necesaria cherrie evaluación más profunda o nuevas radiografías. En algunos casos, las radiografías no muestran que hay un hueso pequeño roto (fractura) hasta 1 semana o 10 días más tarde. Cumpla con las citas de seguimiento kai jordan le indicó el profesional que lo asiste. Consulte con benito médico la fecha en que los resultados de las radiografías estarán disponibles. Asegúrese de obtener los resultados de las radiografías.   Document Released: 09/27/2006 Document Revised: 03/11/2013  [employer]® Patient Information ©2014 [employer].

## 2018-02-12 ENCOUNTER — OCCUPATIONAL MEDICINE (OUTPATIENT)
Dept: OCCUPATIONAL MEDICINE | Facility: CLINIC | Age: 28
End: 2018-02-12
Payer: COMMERCIAL

## 2018-02-12 VITALS
HEIGHT: 66 IN | SYSTOLIC BLOOD PRESSURE: 110 MMHG | DIASTOLIC BLOOD PRESSURE: 58 MMHG | OXYGEN SATURATION: 96 % | BODY MASS INDEX: 32.14 KG/M2 | WEIGHT: 200 LBS | RESPIRATION RATE: 16 BRPM | HEART RATE: 57 BPM | TEMPERATURE: 98 F

## 2018-02-12 DIAGNOSIS — S97.81XD CRUSHING INJURY OF RIGHT FOOT, SUBSEQUENT ENCOUNTER: ICD-10-CM

## 2018-02-12 PROCEDURE — 99202 OFFICE O/P NEW SF 15 MIN: CPT | Performed by: PREVENTIVE MEDICINE

## 2018-02-12 ASSESSMENT — PAIN SCALES - GENERAL: PAINLEVEL: 1=MINIMAL PAIN

## 2018-02-12 NOTE — LETTER
[address],   [address] [place] [address]  Phone:  [phone] - Fax:  [phone]   [facility] Progress Report and Disability Certification  Date of Service: 2/12/2018   No Show:  No  Date / Time of Next Visit:  Discharged    Claim Information   Patient Name: Chandrakant Salazar  Claim Number:     Employer: [employer]  Date of Injury: 1/31/2018     Insurer / TPA: [employer]  ID / SSN:     Occupation: [employer]   Diagnosis: The encounter diagnosis was Crushing injury of right foot, subsequent encounter.    Medical Information   Related to Industrial Injury? Yes    Subjective Complaints:  DOI: 1/31/18. Mechanism of injury-heavy truss fell on his right foot causing a crush injury. 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 He has no complaints. Less pain. Less swelling. No weakness. No numbness.   Objective Findings: Appearance: Well-developed, well-nourished.   Mental Status: Mood and Affect normal. Pleasant. Cooperative. Appropriate.   ENT: Oropharynx clear. Moist mucous membranes. Hearing normal.   Eyes: Pupils reactive. Conjunctiva normal. No scleral icterus.   Neck: Trachea Midline. No thyromegaly. No masses.  Cardiovascular: Normal rate. Regular rhythm. Normal heart sounds.   Chest: Effort normal. Breath sounds clear.   Skin: Skin is warm and dry. No rash.   Musculoskeletal: Right foot shows no swelling, ecchymosis, heat. Normal posture. Normal gait.     Pre-Existing Condition(s):     Assessment:   Condition Improved    Status: Discharged /  MMI  Permanent Disability:No    Plan:      Diagnostics:      Comments:       Disability Information   Status: Released to Full Duty    From:  2/12/2018  Through:   Restrictions are:     Physical Restrictions   Sitting:    Standing:    Stooping:    Bending:      Squatting:    Walking:    Climbing:    Pushing:      Pulling:    Other:    Reaching Above Shoulder  (L):   Reaching Above Shoulder (R):       Reaching Below Shoulder (L):    Reaching Below Shoulder (R):      Not to exceed Weight Limits   Carrying(hrs):   Weight Limit(lb):   Lifting(hrs):   Weight  Limit(lb):     Comments:      Repetitive Actions   Hands: i.e. Fine Manipulations from Grasping:     Feet: i.e. Operating Foot Controls:     Driving / Operate Machinery:     Physician Name: Hemal Thomson M.D. Physician Signature: HEMAL Robles M.D. e-Signature: Dr. Charles Howard, Medical Director   Clinic Name / Location: [address],   [address] [place] [id number] Clinic Phone Number: Dept: [phone]   Appointment Time: 11:10 Am Visit Start Time: 10:38 AM   Check-In Time:  10:20 Am Visit Discharge Time: 11:07AM    Original-Treating Physician or Chiropractor    Page 2-Insurer/TPA    Page 3-Employer    Page 4-Employee

## 2018-02-12 NOTE — PROGRESS NOTES
"Subjective:      Chandrakant Salazar is a 27 y.o. male who presents with Follow-Up ( DOI 01/31/2018 - R Foot - better - room 2)      DOI: 1/31/18. Mechanism of injury-heavy truss fell on his right foot causing a crush injury. 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 He has no complaints. Less pain. Less swelling. No weakness. No numbness.     HPI    ROS  Comprehensive medical history form reviewed. Pertinent positives and negatives included in HPI.    PFSH: reviewed in Epic    PMH:  has no past medical history on file.  MEDS: No current outpatient prescriptions on file.  ALLERGIES: No Known Allergies  SURGHX:   Past Surgical History:   Procedure Laterality Date   • SCROTAL EXPLORATION Bilateral 10/28/2017    Procedure: SCROTAL EXPLORATION;  Surgeon: Mike Castro M.D.;  Location: SURGERY [facility];  Service: Urology   • CYSTOSCOPY N/A 10/28/2017    Procedure: CYSTOSCOPY - possible suprapubic cath placement;  Surgeon: Mike Castro M.D.;  Location: SURGERY [facility];  Service: Urology   • OTHER ABDOMINAL SURGERY      suprapubic cath insert     SOCHX:  reports that he has never smoked. He has never used smokeless tobacco. He reports that he does not drink alcohol or use drugs.  Work Status:  for [employer]  FH: No pertinent hereditary disorders.        Objective:     /58   Pulse (!) 57   Temp 36.7 °C (98 °F)   Resp 16   Ht 1.676 m (5' 6\")   Wt 90.7 kg (200 lb)   SpO2 96%   BMI 32.28 kg/m²      Physical Exam    Appearance: Well-developed, well-nourished.   Mental Status: Mood and Affect normal. Pleasant. Cooperative. Appropriate.   ENT: Oropharynx clear. Moist mucous membranes. Hearing normal.   Eyes: Pupils reactive. Conjunctiva normal. No scleral icterus.   Neck: Trachea Midline. No thyromegaly. No masses.  Cardiovascular: Normal rate. Regular rhythm. Normal heart sounds.   Chest: Effort normal. Breath sounds clear.   Skin: " Skin is warm and dry. No rash.   Musculoskeletal: Right foot shows no swelling, ecchymosis, heat. Normal posture. Normal gait.         Assessment/Plan:     1. Crushing injury of right foot, subsequent encounter  New to Occupational Health from urgent care  Condition improved  Regular work  Release from care

## 2018-03-29 ENCOUNTER — HOSPITAL ENCOUNTER (OUTPATIENT)
Dept: RADIOLOGY | Facility: MEDICAL CENTER | Age: 28
End: 2018-03-29
Attending: UROLOGY
Payer: COMMERCIAL

## 2018-03-29 DIAGNOSIS — N35.014 POST-TRAUMATIC URETHRAL STRICTURE, MALE, UNSPECIFIED: ICD-10-CM

## 2018-03-29 PROCEDURE — 700117 HCHG RX CONTRAST REV CODE 255: Performed by: UROLOGY

## 2018-03-29 PROCEDURE — 51600 INJECTION FOR BLADDER X-RAY: CPT

## 2018-03-29 RX ADMIN — IOHEXOL 200 ML: 240 INJECTION, SOLUTION INTRATHECAL; INTRAVASCULAR; INTRAVENOUS; ORAL at 15:00

## 2018-05-18 VITALS — SYSTOLIC BLOOD PRESSURE: 136 MMHG | DIASTOLIC BLOOD PRESSURE: 82 MMHG

## 2018-06-12 ENCOUNTER — HOSPITAL ENCOUNTER (INPATIENT)
Dept: HOSPITAL 8 - OUT | Age: 28
LOS: 1 days | Discharge: HOME | DRG: 664 | End: 2018-06-13
Attending: UROLOGY | Admitting: UROLOGY
Payer: COMMERCIAL

## 2018-06-12 VITALS — HEIGHT: 66 IN | WEIGHT: 231.49 LBS | BODY MASS INDEX: 37.2 KG/M2

## 2018-06-12 VITALS — SYSTOLIC BLOOD PRESSURE: 104 MMHG | DIASTOLIC BLOOD PRESSURE: 59 MMHG

## 2018-06-12 VITALS — SYSTOLIC BLOOD PRESSURE: 105 MMHG | DIASTOLIC BLOOD PRESSURE: 64 MMHG

## 2018-06-12 DIAGNOSIS — N35.9: Primary | ICD-10-CM

## 2018-06-12 LAB
CULTURE INDICATED?: NO
MICROSCOPIC: (no result)

## 2018-06-12 PROCEDURE — 0W3R8ZZ CONTROL BLEEDING IN GENITOURINARY TRACT, VIA NATURAL OR ARTIFICIAL OPENING ENDOSCOPIC: ICD-10-PCS | Performed by: UROLOGY

## 2018-06-12 PROCEDURE — 80307 DRUG TEST PRSMV CHEM ANLYZR: CPT

## 2018-06-12 PROCEDURE — 0TBD0ZZ EXCISION OF URETHRA, OPEN APPROACH: ICD-10-PCS | Performed by: UROLOGY

## 2018-06-12 PROCEDURE — 87086 URINE CULTURE/COLONY COUNT: CPT

## 2018-06-12 PROCEDURE — 81001 URINALYSIS AUTO W/SCOPE: CPT

## 2018-06-12 PROCEDURE — 88305 TISSUE EXAM BY PATHOLOGIST: CPT

## 2018-06-12 RX ADMIN — FENTANYL CITRATE PRN MCG: 50 INJECTION INTRAMUSCULAR; INTRAVENOUS at 13:21

## 2018-06-12 RX ADMIN — HYDROMORPHONE HYDROCHLORIDE PRN MG: 1 INJECTION, SOLUTION INTRAMUSCULAR; INTRAVENOUS; SUBCUTANEOUS at 13:36

## 2018-06-12 RX ADMIN — HYDROMORPHONE HYDROCHLORIDE PRN MG: 1 INJECTION, SOLUTION INTRAMUSCULAR; INTRAVENOUS; SUBCUTANEOUS at 13:22

## 2018-06-12 RX ADMIN — FENTANYL CITRATE PRN MCG: 50 INJECTION INTRAMUSCULAR; INTRAVENOUS at 13:09

## 2018-06-12 RX ADMIN — DOCUSATE SODIUM SCH MG: 100 CAPSULE, LIQUID FILLED ORAL at 21:22

## 2018-06-12 RX ADMIN — AMOXICILLIN AND CLAVULANATE POTASSIUM SCH TAB: 875; 125 TABLET, FILM COATED ORAL at 16:16

## 2018-06-12 RX ADMIN — FENTANYL CITRATE PRN MCG: 50 INJECTION INTRAMUSCULAR; INTRAVENOUS at 12:50

## 2018-06-12 RX ADMIN — AMOXICILLIN AND CLAVULANATE POTASSIUM SCH TAB: 875; 125 TABLET, FILM COATED ORAL at 21:22

## 2018-06-12 RX ADMIN — OXYBUTYNIN CHLORIDE SCH MG: 5 TABLET ORAL at 21:22

## 2018-06-12 RX ADMIN — SODIUM CHLORIDE, SODIUM LACTATE, POTASSIUM CHLORIDE, AND CALCIUM CHLORIDE SCH MLS/HR: .6; .31; .03; .02 INJECTION, SOLUTION INTRAVENOUS at 16:06

## 2018-06-12 RX ADMIN — OXYBUTYNIN CHLORIDE SCH MG: 5 TABLET ORAL at 16:16

## 2018-06-12 RX ADMIN — HYDROCODONE BITARTRATE AND ACETAMINOPHEN PRN TAB: 5; 325 TABLET ORAL at 16:16

## 2018-06-12 RX ADMIN — HYDROMORPHONE HYDROCHLORIDE PRN MG: 1 INJECTION, SOLUTION INTRAMUSCULAR; INTRAVENOUS; SUBCUTANEOUS at 13:50

## 2018-06-13 VITALS — SYSTOLIC BLOOD PRESSURE: 105 MMHG | DIASTOLIC BLOOD PRESSURE: 54 MMHG

## 2018-06-13 VITALS — DIASTOLIC BLOOD PRESSURE: 53 MMHG | SYSTOLIC BLOOD PRESSURE: 112 MMHG

## 2018-06-13 VITALS — SYSTOLIC BLOOD PRESSURE: 96 MMHG | DIASTOLIC BLOOD PRESSURE: 50 MMHG

## 2018-06-13 RX ADMIN — HYDROCODONE BITARTRATE AND ACETAMINOPHEN PRN TAB: 5; 325 TABLET ORAL at 14:56

## 2018-06-13 RX ADMIN — HYDROCODONE BITARTRATE AND ACETAMINOPHEN PRN TAB: 5; 325 TABLET ORAL at 09:58

## 2018-06-13 RX ADMIN — AMOXICILLIN AND CLAVULANATE POTASSIUM SCH TAB: 875; 125 TABLET, FILM COATED ORAL at 09:58

## 2018-06-13 RX ADMIN — OXYBUTYNIN CHLORIDE SCH MG: 5 TABLET ORAL at 10:00

## 2018-06-13 RX ADMIN — DOCUSATE SODIUM SCH MG: 100 CAPSULE, LIQUID FILLED ORAL at 09:59

## 2018-06-13 RX ADMIN — SODIUM CHLORIDE, SODIUM LACTATE, POTASSIUM CHLORIDE, AND CALCIUM CHLORIDE SCH MLS/HR: .6; .31; .03; .02 INJECTION, SOLUTION INTRAVENOUS at 02:07

## 2018-06-13 RX ADMIN — SODIUM CHLORIDE, SODIUM LACTATE, POTASSIUM CHLORIDE, AND CALCIUM CHLORIDE SCH MLS/HR: .6; .31; .03; .02 INJECTION, SOLUTION INTRAVENOUS at 13:14

## 2018-07-05 ENCOUNTER — HOSPITAL ENCOUNTER (OUTPATIENT)
Dept: RADIOLOGY | Facility: MEDICAL CENTER | Age: 28
End: 2018-07-05
Attending: UROLOGY
Payer: COMMERCIAL

## 2018-07-05 DIAGNOSIS — N35.011 TRAUMATIC BULBOUS URETHRAL STRICTURE: ICD-10-CM

## 2018-07-05 PROCEDURE — 700117 HCHG RX CONTRAST REV CODE 255: Performed by: UROLOGY

## 2018-07-05 PROCEDURE — 74450 X-RAY URETHRA/BLADDER: CPT

## 2018-07-05 RX ADMIN — IOHEXOL 20 ML: 300 INJECTION, SOLUTION INTRAVENOUS at 15:49

## 2018-07-09 ENCOUNTER — HOSPITAL ENCOUNTER (OUTPATIENT)
Dept: RADIOLOGY | Facility: MEDICAL CENTER | Age: 28
End: 2018-07-09
Attending: UROLOGY
Payer: COMMERCIAL

## 2018-07-09 DIAGNOSIS — N35.011 TRAUMATIC BULBOUS URETHRAL STRICTURE: ICD-10-CM

## 2018-07-09 PROCEDURE — 74450 X-RAY URETHRA/BLADDER: CPT

## 2018-07-09 PROCEDURE — 700117 HCHG RX CONTRAST REV CODE 255: Performed by: UROLOGY

## 2018-07-09 RX ADMIN — IOHEXOL 40 ML: 300 INJECTION, SOLUTION INTRAVENOUS at 15:30

## 2019-07-26 ENCOUNTER — OCCUPATIONAL MEDICINE (OUTPATIENT)
Dept: URGENT CARE | Facility: CLINIC | Age: 29
End: 2019-07-26
Payer: COMMERCIAL

## 2019-07-26 ENCOUNTER — APPOINTMENT (OUTPATIENT)
Dept: RADIOLOGY | Facility: IMAGING CENTER | Age: 29
End: 2019-07-26
Attending: PHYSICIAN ASSISTANT
Payer: COMMERCIAL

## 2019-07-26 VITALS
BODY MASS INDEX: 33.59 KG/M2 | HEIGHT: 66 IN | SYSTOLIC BLOOD PRESSURE: 118 MMHG | TEMPERATURE: 97.8 F | RESPIRATION RATE: 16 BRPM | HEART RATE: 56 BPM | DIASTOLIC BLOOD PRESSURE: 80 MMHG | OXYGEN SATURATION: 94 % | WEIGHT: 209 LBS

## 2019-07-26 DIAGNOSIS — S62.202B: ICD-10-CM

## 2019-07-26 DIAGNOSIS — S61.432A PUNCTURE WOUND OF LEFT HAND WITHOUT FOREIGN BODY, INITIAL ENCOUNTER: ICD-10-CM
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
## 2019-07-26 DIAGNOSIS — Z02.1 PRE-EMPLOYMENT DRUG SCREENING: ICD-10-CM
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
## 2019-07-26 DIAGNOSIS — S69.92XA INJURY OF LEFT HAND BY NAIL GUN, INITIAL ENCOUNTER: Primary | ICD-10-CM

## 2019-07-26 DIAGNOSIS — S69.92XA INJURY OF LEFT HAND BY NAIL GUN, INITIAL ENCOUNTER: ICD-10-CM

## 2019-07-26 LAB
AMP AMPHETAMINE: NORMAL
BREATH ALCOHOL COMMENT: NORMAL
COC COCAINE: NORMAL
INT CON NEG: NORMAL
INT CON POS: NORMAL
MET METHAMPHETAMINES: NORMAL
OPI OPIATES: NORMAL
PCP PHENCYCLIDINE: NORMAL
POC BREATHALIZER: 0 PERCENT (ref 0–0.01)
POC DRUG COMMENT 753798-OCCUPATIONAL HEALTH: NEGATIVE
THC: NORMAL

## 2019-07-26 PROCEDURE — 82075 ASSAY OF BREATH ETHANOL: CPT | Mod: 29 | Performed by: PHYSICIAN ASSISTANT

## 2019-07-26 PROCEDURE — 80305 DRUG TEST PRSMV DIR OPT OBS: CPT | Mod: 29 | Performed by: PHYSICIAN ASSISTANT

## 2019-07-26 PROCEDURE — 99214 OFFICE O/P EST MOD 30 MIN: CPT | Mod: 29 | Performed by: PHYSICIAN ASSISTANT

## 2019-07-26 PROCEDURE — 73130 X-RAY EXAM OF HAND: CPT | Mod: TC,LT,29 | Performed by: PHYSICIAN ASSISTANT

## 2019-07-26 RX ORDER — HYDROCODONE BITARTRATE AND ACETAMINOPHEN 5; 325 MG/1; MG/1
1-2 TABLET ORAL EVERY 6 HOURS PRN
Qty: 12 TAB | Refills: 0 | Status: SHIPPED | OUTPATIENT
Start: 2019-07-26 | End: 2019-08-02

## 2019-07-26 RX ORDER — SULFAMETHOXAZOLE AND TRIMETHOPRIM 800; 160 MG/1; MG/1
1 TABLET ORAL 2 TIMES DAILY
Qty: 14 TAB | Refills: 0 | Status: SHIPPED | OUTPATIENT
Start: 2019-07-26 | End: 2019-08-02

## 2019-07-26 ASSESSMENT — ENCOUNTER SYMPTOMS
SORE THROAT: 0
PALPITATIONS: 0
BLURRED VISION: 0
SENSORY CHANGE: 0
WEAKNESS: 0
VOMITING: 0
CHILLS: 0
TINGLING: 0
FEVER: 0
SHORTNESS OF BREATH: 0
NAUSEA: 0
MYALGIAS: 1

## 2019-07-26 NOTE — PROGRESS NOTES
Subjective:      Chandrakant Salazar is a 29 y.o. 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  He says that the nails were approximately 3 inches long but he thinks it only when 1.5 inches into his hand.  He said he almost immediately pulled it out.  He said he has had 8/10 pain in the area of the puncture since the incident occurred and states that as swelling has sudden he has noticed some pain radiating into his fingers as well.  He reports he is right-hand dominant.  This is only form of employment.  He has not noticed any fever/chills or numbness/tingling.  Last tetanus was in 2017    Hand Injury   This is a new problem. The current episode started today. The problem occurs constantly. The problem has been unchanged. Associated symptoms include myalgias (Left hand pain). Pertinent negatives include no chest pain, chills, fever, nausea, sore throat, vomiting or weakness. Exacerbated by: Use of left hand. He has tried nothing for the symptoms.       Review of Systems   Constitutional: Negative for chills and fever.   HENT: Negative for sore throat.    Eyes: Negative for blurred vision.   Respiratory: Negative for shortness of breath.    Cardiovascular: Negative for chest pain and palpitations.   Gastrointestinal: Negative for nausea and vomiting.   Musculoskeletal: Positive for myalgias (Left hand pain). Negative for joint pain.   Skin:        Puncture wound of left hand   Neurological: Negative for tingling, sensory change and weakness.       PMH: No pertinent past medical history to this problem  MEDS: Medications were reviewed in Epic  ALLERGIES: Allergies were reviewed in Epic  SOCHX: Works as a [employer] at [employer]   FH: No pertinent family history to this  "problem     Objective:     /80 (BP Location: Right arm, Patient Position: Sitting, BP Cuff Size: Adult)   Pulse (!) 56   Temp 36.6 °C (97.8 °F) (Temporal)   Resp 16   Ht 1.676 m (5' 6\")   Wt 94.8 kg (209 lb)   SpO2 94%   BMI 33.73 kg/m²      Physical Exam   Constitutional: He is oriented to person, place, and time. He appears well-developed and well-nourished.   HENT:   Head: Normocephalic and atraumatic.   Right Ear: External ear normal.   Left Ear: External ear normal.   Eyes: Pupils are equal, round, and reactive to light. Conjunctivae are normal.   Cardiovascular: Normal rate, regular rhythm and normal heart sounds.    No murmur heard.  Pulmonary/Chest: Effort normal and breath sounds normal. He has no wheezes.   Musculoskeletal:        Hands:  LEFT HAND: Dorsal aspect of left hand overlying the first metacarpal exhibits a small puncture wound.  Minimal active bleeding at time of exam.  No foreign body observed in the wound.  There is significant soft tissue swelling surrounding the area and it is moderately to severely TTP.  Soft tissue swelling extends into the thenar eminence and into the proximal fingers.  Patient has full ROM of left wrist and left fingers though he does note pain with range of motion, most notable with radial deviation.  Sensation intact.  Cap refill less than 2 seconds.  5/5  strength.   Neurological: He is alert and oriented to person, place, and time.   Skin: Skin is warm and dry. Capillary refill takes less than 2 seconds.   Psychiatric: He has a normal mood and affect. His behavior is normal. Judgment normal.       DX-HAND 3+ LEFT  Impression  Nondisplaced cortical fracture of the proximal first metacarpal diaphysis.     Reading Provider Reading Date   Cynthia Elizabeth M.D. Jul 26, 2019          Assessment/Plan:     1. Puncture wound of left hand without foreign body, initial encounter  - DX-HAND 3+ LEFT; Future  - REFERRAL TO ORTHOPEDICS  - " sulfamethoxazole-trimethoprim (BACTRIM DS) 800-160 MG tablet; Take 1 Tab by mouth 2 times a day for 7 days.  Dispense: 14 Tab; Refill: 0  - HYDROcodone-acetaminophen (NORCO) 5-325 MG Tab per tablet; Take 1-2 Tabs by mouth every 6 hours as needed for up to 7 days.  Dispense: 12 Tab; Refill: 0  - Consent for Opiate Prescription    2. Injury of left hand by nail gun, initial encounter  - DX-HAND 3+ LEFT; Future  - REFERRAL TO ORTHOPEDICS  - HYDROcodone-acetaminophen (NORCO) 5-325 MG Tab per tablet; Take 1-2 Tabs by mouth every 6 hours as needed for up to 7 days.  Dispense: 12 Tab; Refill: 0  - Consent for Opiate Prescription    3. Open nondisplaced fracture of first metacarpal bone of left hand, unspecified portion of metacarpal, initial encounter  - REFERRAL TO ORTHOPEDICS  - HYDROcodone-acetaminophen (NORCO) 5-325 MG Tab per tablet; Take 1-2 Tabs by mouth every 6 hours as needed for up to 7 days.  Dispense: 12 Tab; Refill: 0  - Consent for Opiate Prescription        *Wound was cleansed with chlorhexidine gluconate and irrigated copiously with sterile saline.  Sterile, nonstick dressing was applied.  Ortho-Glass splint was then applied to the left hand/wrist.  Patient was endorsing a moderate to severe amount of pain so gave him 12 tablets of Norco but advised him to supplement with OTC NSAIDs.  Patient is to start the prescription antibiotics right away.  Advised him to keep his left arm elevated and apply ice.  I would like him to return in 2 days for wound check but ultimately he is currently transferred to Ortho for further evaluation.  Work restrictions were discussed.  I do not want patient have any use of his left hand until he follows up with Ortho.  Counseled patient that he cannot drive or operate heavy machinery while taking the Norco

## 2019-07-26 NOTE — LETTER
[facility] [address] [facility] [place] [address]  Phone:  [phone] - Fax:  [phone]   [facility] Progress Report and Disability Certification  Date of Service: 7/26/2019   No Show:  No  Date / Time of Next Visit: 7/28/2019   Claim Information   Patient Name: Chandrakant Salazar  Claim Number:     Employer: [employer]  Date of Injury: 7/26/2019     Insurer / TPA: [employer]  ID / SSN:     Occupation: umanzor  Diagnosis: Diagnoses of Puncture wound of left hand without foreign body, initial encounter, Injury of left hand by nail gun, initial encounter, and Open nondisplaced fracture of first metacarpal bone of left hand, unspecified portion of metacarpal, initial encounter were pertinent to this visit.    Medical Information   Related to Industrial Injury? 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  He says that the nails were approximately 3 inches long but he thinks it only when 1.5 inches into his hand.  He said he almost immediately pulled it out.  He said he has had 8/10 pain in the area of the puncture since the incident occurred and states that as swelling has sudden he has noticed some pain radiating into his fingers as well.  He reports he is right-hand dominant.  This is only form of employment.  He has not noticed any fever/chills or numbness/tingling.  Last tetanus was in 2017.   Objective Findings: LEFT HAND: Dorsal aspect of left hand overlying the first metacarpal exhibits a small puncture wound.  Minimal active bleeding at time of exam.  No foreign body observed in the wound.  There is significant soft tissue swelling surrounding the area and it is moderately to severely TTP.  Soft tissue swelling extends into the thenar eminence and into  the proximal fingers.  Patient has full ROM of left wrist and left fingers though he does note pain with range of motion, most notable with radial deviation.  Sensation intact.  Cap refill less than 2 seconds.  5/5  strength.   Pre-Existing Condition(s):     Assessment:   Initial Visit    Status: Additional Care Required  Permanent Disability:No    Plan: Medication    Diagnostics: X-ray  Comments:Nondisplaced cortical fracture of the proximal first metacarpal diaphysis.    Comments:       Disability Information   Status: Released to Restricted Duty    From:  7/26/2019  Through: 7/28/2019 Restrictions are: Temporary   Physical Restrictions   Sitting:    Standing:    Stooping:    Bending:      Squatting:    Walking:    Climbing:    Pushing:      Pulling:    Other:    Reaching Above Shoulder (L):   Reaching Above Shoulder (R):       Reaching Below Shoulder (L):    Reaching Below Shoulder (R):      Not to exceed Weight Limits   Carrying(hrs):   Weight Limit(lb):   Lifting(hrs):   Weight  Limit(lb):     Comments: - Rx antibiotics  -Rx pain meds and OTC NSAIDs  -Keep elevated, apply ice  -Return in 2 days for wound check, referral placed Ortho  -No use of left hand    Repetitive Actions   Hands: i.e. Fine Manipulations from Grasping:     Feet: i.e. Operating Foot Controls:     Driving / Operate Machinery:     Physician Name: Omar Grey P.A.-C. Physician Signature: OMAR Rodas P.A.-C. e-Signature: Dr. Miky Cardenas, Medical Director   Clinic Name / Location: [address]  [place] [address] Clinic Phone Number: Dept: [phone]   Appointment Time: 12:00 Pm Visit Start Time: 1:19 PM   Check-In Time:  12:04 Pm Visit Discharge Time:  3:07 pm   Original-Treating Physician or Chiropractor    Page 2-Insurer/TPA    Page 3-Employer    Page 4-Employee

## 2019-07-26 NOTE — LETTER
"EMPLOYEE’S CLAIM FOR COMPENSATION/ REPORT OF INITIAL TREATMENT  FORM C-4    EMPLOYEE’S CLAIM - PROVIDE ALL INFORMATION REQUESTED   First Name  Chandrakant Last Name  Nino Salazar Birthdate                    1990                Sex  male Claim Number   Home Address  [address]  [address] Age  29 y.o. 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  Zip  [address]    Date of Injury  7/26/2019               Hour of Injury  11:00 AM Date Employer Notified  7/26/2019 Last Day of Work after Injury or Occupational Disease  7/26/2019 Supervisor to Whom Injury Reported  júnior alexandra   Address or Location of Accident (if applicable)     What were you doing at the time of accident? (if applicable)  nailing    How did this injury or occupational disease occur? (Be specific an answer in detail. Use additional sheet if necessary)  was nailing wood and nail came up into hand    If you believe that you have an occupational disease, when did you first have knowledge of the disability and it relationship to your employment?  na Witnesses to the Accident  em nava      Nature of Injury or Occupational Disease  Puncture  Part(s) of Body Injured or Affected  Hand (L), ,     I certify that the above is true and correct to the best of my knowledge and that I have provided this information in order to obtain the benefits of [place] " [place]  I hereby authorize any physician, chiropractor, surgeon, practitioner, or other person, any hospital, including [facility] or [facility] hospital, any medical service organization, any insurance company, or other institution or organization to release to each other, any medical or other information, including benefits paid or payable, pertinent to this injury or disease, except information relative to diagnosis, treatment and/or counseling for AIDS, psychological conditions, alcohol or controlled substances, for which I must give specific authorization.  A Photostat of this authorization shall be as valid as the original.     Date 7/26/19   Place [facility]   Employee’s Signature   THIS REPORT MUST BE COMPLETED AND MAILED WITHIN 3 WORKING DAYS OF TREATMENT   Place  [facility]  Name of Facility  [facility]   Date  7/26/2019 Diagnosis  (S61.432A) Puncture wound of left hand without foreign body, initial encounter  (S69.92XA,  W29.4XXA) Injury of left hand by nail gun, initial encounter  (S62.202B) Open nondisplaced fracture of first metacarpal bone of left hand, unspecified portion of metacarpal, initial encounter Is there evidence the injured employee was under the influence of alcohol and/or another controlled substance at the time of accident?   Hour  1:19 PM Description of Injury or Disease  Diagnoses of Puncture wound of left hand without foreign body, initial encounter, Injury of left hand by nail gun, initial encounter, and Open nondisplaced fracture of first metacarpal bone of left hand, unspecified portion of metacarpal, initial encounter were pertinent to this visit. No   Treatment  - Rx antibiotics  -Rx pain meds and OTC NSAIDs  -Keep elevated, apply ice  -Return in 2 days for wound check, referral placed Ortho  -No use of left hand  Have you advised the patient to remain off work five days or more? No   X-Ray  "Findings  Positive  Comments:ndisplaced cortical fracture of the proximal first metacarpal diaphysis.   If Yes   From Date  To Date      From information given by the employee, together with medical evidence, can you directly connect this injury or occupational disease as job incurred?  Yes If No Full Duty  No Modified Duty  Yes   Is additional medical care by a physician indicated?  Yes If Modified Duty, Specify any Limitations / Restrictions  No use of left hand while at work   Do you know of any previous injury or disease contributing to this condition or occupational disease?                            No   Date  7/26/2019 Print Doctor’s Name Omar Grey P.A.-C. I certify the employer’s copy of  this form was mailed on:   Address  [address] Insurer’s Use Only     [facility]  [id number]    Provider’s Tax ID Number  [id number] Telephone  Dept: [phone]        e-OMAR Martin P.A.-C.   e-Signature: Dr. Miky Cardenas, Medical Director Degree  [place]        ORIGINAL-TREATING PHYSICIAN OR CHIROPRACTOR    PAGE 2-INSURER/TPA    PAGE 3-EMPLOYER    PAGE 4-EMPLOYEE             Form C-4 (rev10/07)              BRIEF DESCRIPTION OF RIGHTS AND BENEFITS  (Pursuant to [place] 616[place].050)    Notice of Injury or Occupational Disease (Incident Report Form C-1): If an injury or occupational disease (OD) arises out of and in the  course of employment, you must provide written notice to your employer as soon as practicable, but no later than 7 days after the accident or  OD. Your employer shall maintain a sufficient supply of the required forms.    Claim for Compensation (Form C-4): If medical treatment is sought, the form C-4 is available at the place of initial treatment. A completed  \"Claim for Compensation\" (Form C-4) must be filed within 90 days after an accident or OD. The treating physician or chiropractor must,  within 3 working days after treatment, complete and mail to the " employer, the employer's insurer and third-party , the Claim for  Compensation.    Medical Treatment: If you require medical treatment for your on-the-job injury or OD, you may be required to select a physician or  chiropractor from a list provided by your workers’ compensation insurer, if it has contracted with an Organization for Managed Care (MCO) or  Preferred Provider Organization (PPO) or providers of health care. If your employer has not entered into a contract with an MCO or PPO, you  may select a physician or chiropractor from the Panel of Physicians and Chiropractors. Any medical costs related to your industrial injury or  OD will be paid by your insurer.    Temporary Total Disability (TTD): If your doctor has certified that you are unable to work for a period of at least 5 consecutive days, or 5  cumulative days in a 20-day period, or places restrictions on you that your employer does not accommodate, you may be entitled to TTD  compensation.    Temporary Partial Disability (TPD): If the wage you receive upon reemployment is less than the compensation for TTD to which you are  entitled, the insurer may be required to pay you TPD compensation to make up the difference. TPD can only be paid for a maximum of 24  months.    Permanent Partial Disability (PPD): When your medical condition is stable and there is an indication of a PPD as a result of your injury or  OD, within 30 days, your insurer must arrange for an evaluation by a rating physician or chiropractor to determine the degree of your PPD. The  amount of your PPD award depends on the date of injury, the results of the PPD evaluation and your age and wage.    Permanent Total Disability (PTD): If you are medically certified by a treating physician or chiropractor as permanently and totally disabled  and have been granted a PTD status by your insurer, you are entitled to receive monthly benefits not to exceed 66 2/3% of your  average  monthly wage. The amount of your PTD payments is subject to reduction if you previously received a PPD award.    Vocational Rehabilitation Services: You may be eligible for vocational rehabilitation services if you are unable to return to the job due to a  permanent physical impairment or permanent restrictions as a result of your injury or occupational disease.    Transportation and Per Konstantin Reimbursement: You may be eligible for travel expenses and per konstantin associated with medical treatment.    Reopening: You may be able to reopen your claim if your condition worsens after claim closure.    Appeal Process: If you disagree with a written determination issued by the insurer or the insurer does not respond to your request, you may  appeal to the Department of Administration, , by following the instructions contained in your determination letter. You must  appeal the determination within 70 days from the date of the determination letter at [address], [address], [place], [place]  [address], or [address], [address], [place] [address]. If you disagree with the  decision, you may appeal to the  Department of Administration, . You must file your appeal within 30 days from the date of the  decision  letter at [address], [address], [place], [place] [address], or [address], [address], [place] [address]. If you  disagree with a decision of an , you may file a petition for judicial review with the District Court. You must do so within 30  days of the Appeal Officer’s decision. You may be represented by an  at your own expense or you may contact the [employer] for possible  representation.    [place]  for Injured Workers (NAIW): If you disagree with a  decision, you may request that NAIW represent you  without charge at an  Hearing. For information  regarding denial of benefits, you may contact the [facility] at: [address], [address], [place] [address], [phone]) [phone], or [address], [address], [place] [address], [phone]    To File a Complaint with the Division: If you wish to file a complaint with the  of the Division of Industrial Relations (DIR),  please contact the Workers’ Compensation Section, [address], [address], [place], [place] [address], telephone (681) [phone], or  [address], [address], [place], [place] [address], telephone [phone]) [phone].    For assistance with Workers’ Compensation Issues: you may contact the Office of the Governor Consumer Health Assistance, [address] [place] [address], [address], [place], [place] [address], Toll Free 1-914.110.9044, Web site: http://govcha.[facility].nv.us, E-mail  Leela@[facility].nv.                                                                                                                                                                                                                                   __________________________________________________________________                                                                   _________________                Employee Name / Signature                                                                                                                                                       Date                                                                                                                                                                                                     D-2 (rev. 10/07)

## 2019-07-28 ENCOUNTER — OCCUPATIONAL MEDICINE (OUTPATIENT)
Dept: URGENT CARE | Facility: CLINIC | Age: 29
End: 2019-07-28
Payer: COMMERCIAL

## 2019-07-28 VITALS
DIASTOLIC BLOOD PRESSURE: 70 MMHG | TEMPERATURE: 98 F | HEART RATE: 40 BPM | SYSTOLIC BLOOD PRESSURE: 118 MMHG | OXYGEN SATURATION: 95 %
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
## 2019-07-28 DIAGNOSIS — S69.92XD: ICD-10-CM

## 2019-07-28 PROCEDURE — 99214 OFFICE O/P EST MOD 30 MIN: CPT | Performed by: FAMILY MEDICINE

## 2019-07-28 NOTE — PROGRESS NOTES
Subjective:      Chandrakant Salazar is a 29 y.o. 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     * feels improved today 7/28/2019      HPI    Review of Systems   Musculoskeletal: Positive for joint pain.   All other systems reviewed and are negative.         Objective:     /70   Pulse (!) 40   Temp 36.7 °C (98 °F) (Temporal)   SpO2 95%      Physical Exam   Constitutional: He appears well-developed. No distress.   HENT:   Head: Normocephalic and atraumatic.   Cardiovascular: Regular rhythm.    No murmur heard.  Pulmonary/Chest: Effort normal. No respiratory distress.   Neurological: He is alert. He exhibits normal muscle tone.   Skin: Skin is warm.   Psychiatric: He has a normal mood and affect. Judgment normal.   Nursing note and vitals reviewed.      Lt hand: well healing PW. Mild TTP. Good srom thumb, NVI       Assessment/Plan:         1. Injury of hand by nail gun, left, subsequent encounter           - work related  - ** NO USE LEFT THUMB **  - use thumb spica splint  - 1 wk recheck here if has not seen ortho by then

## 2019-07-28 NOTE — LETTER
[facility] [address] [place] [address]  Phone:  [phone] - Fax:  [phone]   [facility] Progress Report and Disability Certification  Date of Service: 7/28/2019   No Show:  No  Date / Time of Next Visit: 8/4/2019   Claim Information   Patient Name: Chandrakant Salazar  Claim Number:     Employer: [employer]  Date of Injury: 7/26/2019     Insurer / TPA: [employer] [place]  ID / SSN:     Occupation: umanzor  Diagnosis: The encounter diagnosis was Injury of hand by nail gun, left, subsequent encounter.    Medical Information   Related to Industrial Injury? 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     * feels improved today 7/28/2019    Objective Findings: Lt hand: well healing PW. Mild TTP. Good srom thumb, NVI   Pre-Existing Condition(s):     Assessment:   Condition Improved    Status: Additional Care Required  Permanent Disability:No    Plan:      Diagnostics:      Comments:       Disability Information   Status: Released to Restricted Duty    From:  7/28/2019  Through: 8/4/2019 Restrictions are: Temporary   Physical Restrictions   Sitting:    Standing:    Stooping:    Bending:      Squatting:    Walking:    Climbing:    Pushing:      Pulling:    Other:    Reaching Above Shoulder (L):   Reaching Above Shoulder (R):       Reaching Below Shoulder (L):    Reaching Below Shoulder (R):      Not to exceed Weight Limits   Carrying(hrs):   Weight Limit(lb):   Lifting(hrs):   Weight  Limit(lb):     Comments: ** NO USE LEFT THUMB **    Repetitive Actions   Hands: i.e. Fine Manipulations from Grasping:     Feet: i.e. Operating Foot Controls:     Driving / Operate Machinery:     Physician Name: Garrick Blackwell M.D. Physician Signature: GARRICK Ferguson  M.D. e-Signature: Dr. Miky Cardenas, Medical Director   Clinic Name / Location: [address]  [place] [address] Clinic Phone Number: Dept: [phone]   Appointment Time: 10:00 Am Visit Start Time: 10:11 AM   Check-In Time:  9:55 Am Visit Discharge Time:  ***   Original-Treating Physician or Chiropractor    Page 2-Insurer/TPA    Page 3-Employer    Page 4-Employee

## 2022-11-09 ENCOUNTER — OCCUPATIONAL MEDICINE (OUTPATIENT)
Dept: URGENT CARE | Facility: CLINIC | Age: 32
End: 2022-11-09
Payer: COMMERCIAL

## 2022-11-09 ENCOUNTER — OCCUPATIONAL MEDICINE (OUTPATIENT)
Dept: URGENT CARE | Facility: PHYSICIAN GROUP | Age: 32
End: 2022-11-09
Payer: COMMERCIAL

## 2022-11-09 VITALS — BODY MASS INDEX: 35.09 KG/M2 | WEIGHT: 217.4 LBS

## 2022-11-09 DIAGNOSIS — S61.431A PUNCTURE WOUND OF RIGHT HAND WITHOUT FOREIGN BODY, INITIAL ENCOUNTER: ICD-10-CM

## 2022-11-09 PROCEDURE — 99202 OFFICE O/P NEW SF 15 MIN: CPT | Performed by: NURSE PRACTITIONER

## 2022-11-09 ASSESSMENT — ENCOUNTER SYMPTOMS
MYALGIAS: 1
SENSORY CHANGE: 0
FOCAL WEAKNESS: 0
TINGLING: 0

## 2022-11-09 NOTE — LETTER
[facility] [address] [place] [address]  Phone:  [phone] - Fax:  [phone]   [facility] Progress Report and Disability Certification  Date of Service: 11/9/2022   No Show:  No  Date / Time of Next Visit: 11/13/2022 @ 10:00 AM    Claim Information   Patient Name: Chandrakant Salazar  Claim Number:     Employer: [employer]  Date of Injury: 11/7/2022     Insurer / TPA: [employer]  ID / SSN:     Occupation: [employer]  Diagnosis: The encounter diagnosis was Puncture wound of right hand without foreign body, initial encounter.    Medical Information   Related to Industrial Injury? Yes    Subjective Complaints:  11/7/22. Patient is 32 year old male with puncture wound to right hand at second metacarpal space from a nail gun. He rates the pain as a 6/10 and only when flexion of right finger. He is up to date on tetanus. He is right hand dominant. He denies redness or swelling. No discharge. Denies distal paresthesia.   Objective Findings: Physical Exam  Constitutional:       Appearance: Normal appearance.   Musculoskeletal:         General: Normal range of motion.      Comments: Very mild tenderness over injury. No erythema, induration or discharge. No decreased strength of finger.   Skin:     General: Skin is warm and dry.   Neurological:      General: No focal deficit present.      Mental Status: He is alert and oriented to person, place, and time.   Psychiatric:         Mood and Affect: Mood normal.         Behavior: Behavior normal.      Pre-Existing Condition(s):     Assessment:   Initial Visit    Status: Additional Care Required  Permanent Disability:No    Plan:      Diagnostics:      Comments:       Disability Information   Status: Released to Restricted Duty    From:  11/9/2022  Through: 11/13/2022 Restrictions are: Temporary   Physical Restrictions   Sitting:    Standing:    Stooping:    Bending:       Squatting:    Walking:    Climbing:    Pushing:      Pulling:    Other:    Reaching Above Shoulder (L):   Reaching Above Shoulder (R):       Reaching Below Shoulder (L):    Reaching Below Shoulder (R):      Not to exceed Weight Limits   Carrying(hrs):   Weight Limit(lb):   Lifting(hrs):   Weight  Limit(lb):     Comments:      Repetitive Actions   Hands: i.e. Fine Manipulations from Graspin hrs/day  Comments:right hand   Feet: i.e. Operating Foot Controls:     Driving / Operate Machinery:     Health Care Provider’s Original or Electronic Signature  Michael Cole A.P.N. Health Care Provider’s Original or Electronic Signature    Beltran Salazar MD         [address] [place] [address] Clinic Phone Number: Dept: [phone]   Appointment Time: 11:45 Am Visit Start Time: 1:23 PM   Check-In Time:  11:36 Am Visit Discharge Time:  2:35 PM    Original-Treating Physician or Chiropractor    Page 2-Insurer/TPA    Page 3-Employer    Page 4-Employee

## 2022-11-09 NOTE — LETTER
"EMPLOYEE’S CLAIM FOR COMPENSATION/ REPORT OF INITIAL TREATMENT  FORM C-4    EMPLOYEE’S CLAIM - PROVIDE ALL INFORMATION REQUESTED   First Name  Chandrakant Last Name  Nino Salazar Birthdate                    1990                Sex  male Claim Number (Insurer’s Use Only)    Home Address  [address] Age  32 y.o. Height  (P) 1.676 m (5' 6\") Weight  98.6 kg (217 lb 6.4 oz) [facility]     [facility] Zip  [address] Telephone  [phone] (home)    Mailing Address  [address]  [address] [facility] Zip  [address] Primary Language Spoken  Chilean    Insurer   Third-Party   [employer]   Employee's Occupation (Job Title) When Injury or Occupational Disease Occurred  [employer]    Employer's Name/Company Name  [employer]  Telephone  [phone]    Office Mail Address (Number and Street)   [address]   [facility]  Zip  [address]    Date of Injury  11/7/2022               Hours Injury  8:30 AM Date Employer Notified  11/7/2022 Last Day of Work after Injury     or Occupational Disease  11/7/2022 Supervisor to Whom Injury     Reported  Conderadrio   Address or Location of Accident (if applicable)  Work [1]   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 (Be specific an answer in detail. Use additional sheet if necessary)  se desvio el clavo y me clave   If you believe that you have an occupational disease, when did you first have knowledge of the disability and it relationship to your employment?  n/a Witnesses to the Accident  n/a      Nature of Injury or Occupational Disease  Puncture  Part(s) of Body Injured or Affected  Finger (R), N/A, N/A    I certify that the above is true and correct to the best of my knowledge and that I have provided this information in order to obtain " the benefits of [place]  I hereby authorize any physician, chiropractor, surgeon, practitioner, or other person, any hospital, including [facility] or [facility] hospital, any medical service organization, any insurance company, or other institution or organization to release to each other, any medical or other information, including benefits paid or payable, pertinent to this injury or disease, except information relative to diagnosis, treatment and/or counseling for AIDS, psychological conditions, alcohol or controlled substances, for which I must give specific authorization.  A Photostat of this authorization shall be as valid as the original.     Date 11/09/2022   [facility]  Employee’s Original or  *Electronic Signature   THIS REPORT MUST BE COMPLETED AND MAILED WITHIN 3 WORKING DAYS OF TREATMENT   Place  [facility]  Name of Facility  [facility]   Date  11/9/2022 Diagnosis and Description of Injury or Occupational Disease  (S61.431A) Puncture wound of right hand without foreign body, initial encounter Is there evidence the injured employee was under the influence of alcohol and/or another controlled substance at the time of accident?  ? No ? Yes (if yes, please explain)    Hour  1:23 PM   The encounter diagnosis was Puncture wound of right hand without foreign body, initial encounter. No   Treatment  Keep wound clean and dry with soap and water 2 times daily. Ibuprofen and icing.  Have you advised the patient to remain off work five days or     more?    X-Ray Findings      ? Yes Indicate dates:   From   To      From information given by the employee, together with medical evidence, can        you directly connect this injury or occupational disease as job incurred?  Yes ? No If no, is the injured employee capable of:  ? full duty  No ? modified duty  Yes   Is additional  "medical care by a physician indicated?  Yes If Modified Duty, Specify any Limitations / Restrictions  Per D39   Do you know of any previous injury or disease contributing to this condition or occupational disease?  ? Yes ? No (Explain if yes)                          No   Date  11/9/2022 Print Health Care Provider's   JOSE Rosenthal I certify the employer’s copy of  this form was mailed on:   Address  [address] Insurer’s Use Only     [facility] Zip  [address]    Provider’s Tax ID Number  [id number] Telephone  Dept: [phone]             Health Care Provider’s Original or Electronic Signature  e-LEAH Ralph Degree (MD,DO, DC,PA-C,APRN)   APRN      * Complete and attach Release of Information (Form C-4A) when injured employee signs C-4 Form electronically  ORIGINAL - TREATING HEALTHCARE PROVIDER PAGE 2 - INSURER/TPA PAGE 3 - EMPLOYER PAGE 4 - EMPLOYEE             Form C-4 (rev.08/21)           BRIEF DESCRIPTION OF RIGHTS AND BENEFITS  (Pursuant to [place] 616C.050)    Notice of Injury or Occupational Disease (Incident Report Form C-1): If an injury or occupational disease (OD) arises out of and in the course of employment, you must provide written notice to your employer as soon as practicable, but no later than 7 days after the accident or OD. Your employer shall maintain a sufficient supply of the required forms.    Claim for Compensation (Form C-4): If medical treatment is sought, the form C-4 is available at the place of initial treatment. A completed \"Claim for Compensation\" (Form C-4) must be filed within 90 days after an accident or OD. The treating physician or chiropractor must, within 3 working days after treatment, complete and mail to the employer, the employer's insurer and third-party , the Claim for Compensation.    Medical Treatment: If you require medical treatment for your on-the-job injury or OD, you may be required to select a physician or " chiropractor from a list provided by your workers’ compensation insurer, if it has contracted with an Organization for Managed Care (MCO) or Preferred Provider Organization (PPO) or providers of health care. If your employer has not entered into a contract with an MCO or PPO, you may select a physician or chiropractor from the Panel of Physicians and Chiropractors. Any medical costs related to your industrial injury or OD will be paid by your insurer.    Temporary Total Disability (TTD): If your doctor has certified that you are unable to work for a period of at least 5 consecutive days, or 5 cumulative days in a 20-day period, or places restrictions on you that your employer does not accommodate, you may be entitled to TTD compensation.    Temporary Partial Disability (TPD): If the wage you receive upon reemployment is less than the compensation for TTD to which you are entitled, the insurer may be required to pay you TPD compensation to make up the difference. TPD can only be paid for a maximum of 24 months.    Permanent Partial Disability (PPD): When your medical condition is stable and there is an indication of a PPD as a result of your injury or OD, within 30 days, your insurer must arrange for an evaluation by a rating physician or chiropractor to determine the degree of your PPD. The amount of your PPD award depends on the date of injury, the results of the PPD evaluation, your age and wage.    Permanent Total Disability (PTD): If you are medically certified by a treating physician or chiropractor as permanently and totally disabled and have been granted a PTD status by your insurer, you are entitled to receive monthly benefits not to exceed 66 2/3% of your average monthly wage. The amount of your PTD payments is subject to reduction if you previously received a lump-sum PPD award.    Vocational Rehabilitation Services: You may be eligible for vocational rehabilitation services if you are unable to return to  the job due to a permanent physical impairment or permanent restrictions as a result of your injury or occupational disease.    Transportation and Per Konstantin Reimbursement: You may be eligible for travel expenses and per konstantin associated with medical treatment.    Reopening: You may be able to reopen your claim if your condition worsens after claim closure.     Appeal Process: If you disagree with a written determination issued by the insurer or the insurer does not respond to your request, you may appeal to the Department of Administration, , by following the instructions contained in your determination letter. You must appeal the determination within 70 days from the date of the determination letter at [address], [address], [place], [place] [address], or [address], [place], [place] [address]. If you disagree with the  decision, you may appeal to the Department of Administration, . You must file your appeal within 30 days from the date of the  decision letter at [address], [address], [place], [place] [address], or [address], [place] [address]. If you disagree with a decision of an , you may file a petition for judicial review with the District Court. You must do so within 30 days of the Appeal Officer’s decision. You may be represented by an  at your own expense or you may contact the [facility] for possible representation.    [place]  for Injured Workers (NAIW): If you disagree with a  decision, you may request that NAIW represent you without charge at an  Hearing. For information regarding denial of benefits, you may contact the [facility] at: [address], [address], [place] [address], [phone], or [address] [address], [phone]    To File a Complaint with the Division: If you wish  to file a complaint with the  of the Division of Industrial Relations (DIR),  please contact the Workers’ Compensation Section, [address], [address], [place], [place] [address], telephone (725) [phone], or [address], [address], [place], [place] [address], telephone [phone].    For assistance with Workers’ Compensation Issues: You may contact the [facility] Office for Consumer Health Assistance, [address], [address], [place], [place] [address], Toll Free 1-553.999.4525, Web site: http://[facility].[place].gov/Programs/TRUMAN E-mail: truman@[facility].nv.gov              __________________________________________________________________                                    _________________            Employee Name / Signature                                                                                                                            Date                                                                                                                                                                                                                              D-2 (rev. 10/20)

## 2022-11-09 NOTE — PROGRESS NOTES
Subjective     Chandrakant Salazar is a 32 y.o. male who presents with Finger Injury (Right index finger puncture with nail X Monday at 830)      11/7/22. Patient is 32 year old male with puncture wound to right hand at second metacarpal space from a nail gun. He rates the pain as a 6/10 and only when flexion of right finger. He is up to date on tetanus. He is right hand dominant. He denies redness or swelling. No discharge. Denies distal paresthesia.     HPI11/7/22. Patient is 32 year old male with puncture wound to right hand at second metacarpal space from a nail gun. He rates the pain as a 6/10 and only when flexion of right finger. He is up to date on tetanus. He is right hand dominant. He denies redness or swelling. No discharge. Denies distal paresthesia.    Patient has no known allergies.  Current Outpatient Medications on File Prior to Visit   Medication Sig Dispense Refill    Ibuprofen (ADVIL PO) Take  by mouth.       No current facility-administered medications on file prior to visit.     Social History     Socioeconomic History    Marital status: Single     Spouse name: Not on file    Number of children: Not on file    Years of education: Not on file    Highest education level: Not on file   Occupational History    Not on file   Tobacco Use    Smoking status: Former     Packs/day: 0.10     Types: Cigarettes    Smokeless tobacco: Never    Tobacco comments:     social smoker   Vaping Use    Vaping Use: Never used   Substance and Sexual Activity    Alcohol use: Yes     Alcohol/week: 2.4 oz     Types: 4 Standard drinks or equivalent per week    Drug use: No    Sexual activity: Not on file   Other Topics Concern    Not on file   Social History Narrative    ** Merged History Encounter **          Social Determinants of Health     Financial Resource Strain: Not on file   Food Insecurity: Not on file   Transportation Needs: Not on file   Physical Activity: Not on file   Stress: Not on file   Social  "Connections: Not on file   Intimate Partner Violence: Not on file   Housing Stability: Not on file     Breast Cancer-related family history is not on file.      Review of Systems   Musculoskeletal:  Positive for joint pain and myalgias.   Neurological:  Negative for tingling, sensory change and focal weakness.            Objective     BP (!) (P) 92/66 (BP Location: Right arm, Patient Position: Sitting, BP Cuff Size: Adult long)   Pulse (!) (P) 48   Temp (P) 36.3 °C (97.4 °F) (Temporal)   Resp (P) 16   Ht (P) 1.676 m (5' 6\") Comment: PER PT  Wt 98.6 kg (217 lb 6.4 oz)   SpO2 (P) 98%   BMI (P) 35.09 kg/m²      Physical Exam  Constitutional:       Appearance: Normal appearance.   Musculoskeletal:         General: Normal range of motion.      Comments: Very mild tenderness over injury. No erythema, induration or discharge. No decreased strength of finger.   Skin:     General: Skin is warm and dry.   Neurological:      General: No focal deficit present.      Mental Status: He is alert and oriented to person, place, and time.   Psychiatric:         Mood and Affect: Mood normal.         Behavior: Behavior normal.       Physical Exam  Constitutional:       Appearance: Normal appearance.   Musculoskeletal:         General: Normal range of motion.      Comments: Very mild tenderness over injury. No erythema, induration or discharge. No decreased strength of finger.   Skin:     General: Skin is warm and dry.   Neurological:      General: No focal deficit present.      Mental Status: He is alert and oriented to person, place, and time.   Psychiatric:         Mood and Affect: Mood normal.         Behavior: Behavior normal.                    Assessment & Plan        1. Puncture wound of right hand without foreign body, initial encounter          Restrictions per d 39  Ibuprofen and icing.  He is up to date on tetanus.  Follow up Sunday.           "

## 2022-11-13 ENCOUNTER — OCCUPATIONAL MEDICINE (OUTPATIENT)
Dept: URGENT CARE | Facility: CLINIC | Age: 32
End: 2022-11-13
Payer: COMMERCIAL

## 2022-11-13 VITALS
DIASTOLIC BLOOD PRESSURE: 76 MMHG | OXYGEN SATURATION: 97 % | RESPIRATION RATE: 14 BRPM | TEMPERATURE: 97.6 F | BODY MASS INDEX: 34.87 KG/M2 | HEIGHT: 66 IN | WEIGHT: 217 LBS | HEART RATE: 57 BPM | SYSTOLIC BLOOD PRESSURE: 102 MMHG

## 2022-11-13 DIAGNOSIS — S61.431D PUNCTURE WOUND OF RIGHT HAND WITHOUT FOREIGN BODY, SUBSEQUENT ENCOUNTER: ICD-10-CM

## 2022-11-13 PROCEDURE — 99213 OFFICE O/P EST LOW 20 MIN: CPT | Performed by: PHYSICIAN ASSISTANT

## 2022-11-13 NOTE — PROGRESS NOTES
"Subjective:     Chandrakant Salazar is a 32 y.o. male who presents for Work-Related Injury ( FV DOI: 11-07-22 R Hand injury)      Date of injury 11/7/2022: Patient presents to urgent care for second follow-up visit after puncture injury of right index finger with nail gun.  He reports that he is significantly improved today and has improved range of motion.  He has not yet returned to work but is eager to return and feels capable of returning to full duty without restrictions.  As before he has no contributory comorbidities and no second job     PMH:   No pertinent past medical history to this problem  MEDS:  Medications were reviewed in EMR  ALLERGIES:  Allergies were reviewed in EMR  SOCHX:  Works as a   FH:   No pertinent family history to this problem       Objective:     /76   Pulse (!) 57   Temp 36.4 °C (97.6 °F) (Temporal)   Resp 14   Ht 1.676 m (5' 6\")   Wt 98.4 kg (217 lb)   SpO2 97%   BMI 35.02 kg/m²     alert nontoxic male no acute distress.  Pre-existing mallet finger of the right index finger however area of concern over PIP demonstrates healing puncture wound with trace surrounding swelling, no tenderness crepitance or ecchymosis.  Full flexion and extension of the digit with 5 out of 5  strength      Assessment/Plan:       1. Puncture wound of right hand without foreign body, subsequent encounter    Released to Full Duty      Professional  services used,  was Cb translating [place] to English via telephone    Differential diagnosis, natural history, supportive care, and indications for immediate follow-up discussed.    "

## 2022-11-13 NOTE — LETTER
[facility] [address] [place] [address]  Phone:  [phone] - Fax:  [phone]   [facility] Progress Report and Disability Certification  Date of Service: 11/13/2022   No Show:  No  Date / Time of Next Visit:     Claim Information   Patient Name: Chandrakant Salazar  Claim Number:     Employer: [employer]  Date of Injury: 11/7/2022     Insurer / TPA: [employer]  ID / SSN:     Occupation: [employer]  Diagnosis: The encounter diagnosis was Puncture wound of right hand without foreign body, subsequent encounter.    Medical Information   Related to Industrial Injury? Yes    Subjective Complaints:  Date of injury 11/7/2022: Patient presents to urgent care for second follow-up visit after puncture injury of right index finger with nail gun.  He reports that he is significantly improved today and has improved range of motion.  He has not yet returned to work but is eager to return and feels capable of returning to full duty without restrictions.  As before he has no contributory comorbidities and no second job    Objective Findings: alert nontoxic male no acute distress.  Pre-existing mallet finger of the right index finger however area of concern over PIP demonstrates healing puncture wound with trace surrounding swelling, no tenderness crepitance or ecchymosis.  Full flexion and extension of the digit with 5 out of 5  strength     Pre-Existing Condition(s):     Assessment:   Condition Improved    Status: Discharged /  MMI  Permanent Disability:No    Plan:      Diagnostics:      Comments:  Professional  services used,  was Cb translating Moldovan to English via telephone    Disability Information   Status: Released to Full Duty    From:     Through:   Restrictions are:     Physical Restrictions   Sitting:    Standing:    Stooping:    Bending:      Squatting:    Walking:    Climbing:     Pushing:      Pulling:    Other:    Reaching Above Shoulder (L):   Reaching Above Shoulder (R):       Reaching Below Shoulder (L):    Reaching Below Shoulder (R):      Not to exceed Weight Limits   Carrying(hrs):   Weight Limit(lb):   Lifting(hrs):   Weight  Limit(lb):     Comments:      Repetitive Actions   Hands: i.e. Fine Manipulations from Grasping:     Feet: i.e. Operating Foot Controls:     Driving / Operate Machinery:     Health Care Provider’s Original or Electronic Signature  Angel Fofana P.A.-C. Health Care Provider’s Original or Electronic Signature    Sammy Sebastian DO MPH     [address] [place] [address] Clinic Phone Number: Dept: [phone]   Appointment Time: 10:00 Am Visit Start Time: 10:08 AM   Check-In Time:  9:57 Am Visit Discharge Time:  10:40 AM   Original-Treating Physician or Chiropractor    Page 2-Insurer/TPA    Page 3-Employer    Page 4-Employee

## (undated) DEVICE — SODIUM CHL IRRIGATION 0.9% 1000ML (12EA/CA)

## (undated) DEVICE — WATER IRRIG. STER 3000 ML - (4/CA)

## (undated) DEVICE — PACK CYSTOSCOPY III - (8/CA)

## (undated) DEVICE — BLADE SURGICAL #15 - (50/BX 3BX/CA)

## (undated) DEVICE — SENSOR SPO2 NEO LNCS ADHESIVE (20/BX) SEE USER NOTES

## (undated) DEVICE — SUCTION INSTRUMENT YANKAUER BULBOUS TIP W/O VENT (50EA/CA)

## (undated) DEVICE — CATHETER FOLEY ROBINSON 16FR 16IN STRL (12EA/CA)

## (undated) DEVICE — GOWN SURGICAL X-LARGE ULTRA - FILM-REINFORCED (20/CA)

## (undated) DEVICE — DRESSING NON ADHERENT 3 X 4 - STERILE (100/BX 12BX/CA)

## (undated) DEVICE — TRAY SRGPRP PVP IOD WT PRP - (20/CA)

## (undated) DEVICE — WIRE GUIDE SENSOR DUAL FLEX - 5/BX

## (undated) DEVICE — SPONGE XRAY 8X4 STERL. 12PL - (10EA/TY 80TY/CA)

## (undated) DEVICE — CATHETER FOLEY ROBINSON 8FR 16IN STRL (12/CA)

## (undated) DEVICE — ELECTRODE DUAL RETURN W/ CORD - (50/PK)

## (undated) DEVICE — SPONGE GAUZESTER 4 X 4 4PLY - (128PK/CA)

## (undated) DEVICE — DRAIN PENROSE STERILE 1/4 X - 18 IN  (25EA/BX)

## (undated) DEVICE — ELECTRODE 850 FOAM ADHESIVE - HYDROGEL RADIOTRNSPRNT (50/PK)

## (undated) DEVICE — STAPLER SKIN DISP - (6/BX 10BX/CA) VISISTAT

## (undated) DEVICE — SET IRRIGATION CYSTOSCOPY Y-TYPE L81 IN (20EA/CA)

## (undated) DEVICE — SUTURE 2-0 SILK FS (12EA/BX)

## (undated) DEVICE — JELLY, KY 2 0Z STERILE

## (undated) DEVICE — SUTURE 2-0 SILK 12 X 18" (36PK/BX)"

## (undated) DEVICE — BAG DRAINAGE URINARY CLOSED 2000ML (20EA/CA)

## (undated) DEVICE — SET LEADWIRE 5 LEAD BEDSIDE DISPOSABLE ECG (1SET OF 5/EA)

## (undated) DEVICE — TUBE CONNECT SUCTION CLEAR 120 X 1/4" (50EA/CA)"

## (undated) DEVICE — MASK ANESTHESIA ADULT  - (100/CA)

## (undated) DEVICE — PROTECTOR ULNA NERVE - (36PR/CA)

## (undated) DEVICE — SET EXTENSION WITH 2 PORTS (48EA/CA) ***PART #2C8610 IS A SUBSTITUTE*****

## (undated) DEVICE — CATHETER SUPRAPUBIC INTRO SET (6/CA)

## (undated) DEVICE — HEAD HOLDER JUNIOR/ADULT

## (undated) DEVICE — GOWN SURGEONS X-LARGE - DISP. (30/CA)

## (undated) DEVICE — NEPTUNE 4 PORT MANIFOLD - (20/PK)

## (undated) DEVICE — KIT ANESTHESIA W/CIRCUIT & 3/LT BAG W/FILTER (20EA/CA)

## (undated) DEVICE — TUBING CLEARLINK DUO-VENT - C-FLO (48EA/CA)

## (undated) DEVICE — SUTURE 0 SILK 12 X 18 (36PK/BX)

## (undated) DEVICE — SUTURE 3-0 CHROMIC GUT FS-2 27 (36PK/BX)"

## (undated) DEVICE — CONNECTOR HOSE NEPTUNE FOR CYSTO ROOM

## (undated) DEVICE — SYRINGE 10 ML CONTROL LL (25EA/BX 4BX/CA)

## (undated) DEVICE — DRAPE LAPAROTOMY T SHEET - (12EA/CA)

## (undated) DEVICE — SUTURE 2-0 CHROMIC GUT SH 27 (36PK/BX)"

## (undated) DEVICE — CANISTER SUCTION 3000ML MECHANICAL FILTER AUTO SHUTOFF MEDI-VAC NONSTERILE LF DISP  (40EA/CA)

## (undated) DEVICE — PACK MINOR BASIN - (2EA/CA)

## (undated) DEVICE — GLOVE BIOGEL SZ 7.5 SURGICAL PF LTX - (50PR/BX 4BX/CA)

## (undated) DEVICE — BAG URODRAIN WITH TUBING - (20/CA)

## (undated) DEVICE — GOWN WARMING STANDARD FLEX - (30/CA)

## (undated) DEVICE — KIT ROOM DECONTAMINATION

## (undated) DEVICE — LACTATED RINGERS INJ 1000 ML - (14EA/CA 60CA/PF)

## (undated) DEVICE — SUTURE GENERAL

## (undated) DEVICE — SLEEVE, VASO, THIGH, MED

## (undated) DEVICE — SUTURE 4-0 CHROMIC GUTSH 27 (36PK/BX)"

## (undated) DEVICE — SUTURE 4-0 PROLENE RB-1 D/A 36 (36PK/BX)"